# Patient Record
Sex: FEMALE | Race: WHITE | NOT HISPANIC OR LATINO | Employment: OTHER | URBAN - METROPOLITAN AREA
[De-identification: names, ages, dates, MRNs, and addresses within clinical notes are randomized per-mention and may not be internally consistent; named-entity substitution may affect disease eponyms.]

---

## 2017-04-10 ENCOUNTER — TRANSCRIBE ORDERS (OUTPATIENT)
Dept: ADMINISTRATIVE | Facility: HOSPITAL | Age: 67
End: 2017-04-10

## 2017-04-10 DIAGNOSIS — R31.9 HEMATURIA SYNDROME: Primary | ICD-10-CM

## 2017-04-14 ENCOUNTER — HOSPITAL ENCOUNTER (OUTPATIENT)
Dept: CT IMAGING | Facility: CLINIC | Age: 67
Discharge: HOME | End: 2017-04-14
Payer: MEDICARE

## 2017-04-14 DIAGNOSIS — R31.9 HEMATURIA SYNDROME: ICD-10-CM

## 2017-04-14 PROCEDURE — 74176 CT ABD & PELVIS W/O CONTRAST: CPT

## 2018-05-15 ENCOUNTER — TRANSCRIBE ORDERS (OUTPATIENT)
Dept: ADMINISTRATIVE | Facility: HOSPITAL | Age: 68
End: 2018-05-15

## 2018-05-15 DIAGNOSIS — R10.13 ABDOMINAL PAIN, EPIGASTRIC: Primary | ICD-10-CM

## 2018-05-17 ENCOUNTER — HOSPITAL ENCOUNTER (OUTPATIENT)
Dept: ULTRASOUND IMAGING | Facility: CLINIC | Age: 68
Discharge: HOME | End: 2018-05-17
Payer: MEDICARE

## 2018-05-17 DIAGNOSIS — R10.13 ABDOMINAL PAIN, EPIGASTRIC: ICD-10-CM

## 2018-05-17 PROCEDURE — 76705 ECHO EXAM OF ABDOMEN: CPT

## 2018-12-20 ENCOUNTER — APPOINTMENT (OUTPATIENT)
Dept: RADIOLOGY | Facility: CLINIC | Age: 68
End: 2018-12-20
Payer: MEDICARE

## 2018-12-20 ENCOUNTER — TRANSCRIBE ORDERS (OUTPATIENT)
Dept: ADMINISTRATIVE | Facility: HOSPITAL | Age: 68
End: 2018-12-20

## 2018-12-20 DIAGNOSIS — R06.02 SHORTNESS OF BREATH: Primary | ICD-10-CM

## 2018-12-20 DIAGNOSIS — R06.02 SHORTNESS OF BREATH: ICD-10-CM

## 2018-12-20 PROCEDURE — 71046 X-RAY EXAM CHEST 2 VIEWS: CPT

## 2019-03-07 ENCOUNTER — TRANSCRIBE ORDERS (OUTPATIENT)
Dept: ADMINISTRATIVE | Facility: HOSPITAL | Age: 69
End: 2019-03-07

## 2019-03-07 ENCOUNTER — HOSPITAL ENCOUNTER (OUTPATIENT)
Dept: RADIOLOGY | Facility: HOSPITAL | Age: 69
Discharge: HOME | End: 2019-03-07
Payer: MEDICARE

## 2019-03-07 DIAGNOSIS — R06.02 SHORTNESS OF BREATH: ICD-10-CM

## 2019-03-07 DIAGNOSIS — R06.02 SHORTNESS OF BREATH: Primary | ICD-10-CM

## 2019-03-07 PROCEDURE — 71046 X-RAY EXAM CHEST 2 VIEWS: CPT

## 2021-03-04 DIAGNOSIS — Z23 ENCOUNTER FOR IMMUNIZATION: ICD-10-CM

## 2021-03-25 ENCOUNTER — IMMUNIZATIONS (OUTPATIENT)
Dept: FAMILY MEDICINE CLINIC | Facility: HOSPITAL | Age: 71
End: 2021-03-25

## 2021-03-25 DIAGNOSIS — Z23 ENCOUNTER FOR IMMUNIZATION: Primary | ICD-10-CM

## 2021-03-25 PROCEDURE — 91300 SARS-COV-2 / COVID-19 MRNA VACCINE (PFIZER-BIONTECH) 30 MCG: CPT

## 2021-03-25 PROCEDURE — 0001A SARS-COV-2 / COVID-19 MRNA VACCINE (PFIZER-BIONTECH) 30 MCG: CPT

## 2021-04-08 ENCOUNTER — TRANSCRIBE ORDERS (OUTPATIENT)
Dept: ADMINISTRATIVE | Facility: HOSPITAL | Age: 71
End: 2021-04-08

## 2021-04-08 DIAGNOSIS — R10.13 EPIGASTRIC PAIN: Primary | ICD-10-CM

## 2021-04-12 ENCOUNTER — HOSPITAL ENCOUNTER (OUTPATIENT)
Dept: ULTRASOUND IMAGING | Facility: CLINIC | Age: 71
Discharge: HOME | End: 2021-04-12
Payer: MEDICARE

## 2021-04-12 DIAGNOSIS — R10.13 EPIGASTRIC PAIN: ICD-10-CM

## 2021-04-12 PROCEDURE — 76705 ECHO EXAM OF ABDOMEN: CPT

## 2021-04-16 ENCOUNTER — IMMUNIZATIONS (OUTPATIENT)
Dept: FAMILY MEDICINE CLINIC | Facility: HOSPITAL | Age: 71
End: 2021-04-16

## 2021-04-16 DIAGNOSIS — Z23 ENCOUNTER FOR IMMUNIZATION: Primary | ICD-10-CM

## 2021-04-16 PROCEDURE — 0002A SARS-COV-2 / COVID-19 MRNA VACCINE (PFIZER-BIONTECH) 30 MCG: CPT

## 2021-04-16 PROCEDURE — 91300 SARS-COV-2 / COVID-19 MRNA VACCINE (PFIZER-BIONTECH) 30 MCG: CPT

## 2022-12-07 ENCOUNTER — APPOINTMENT (OUTPATIENT)
Dept: RADIOLOGY | Facility: CLINIC | Age: 72
End: 2022-12-07

## 2022-12-07 DIAGNOSIS — R06.09 DYSPNEA ON EXERTION: ICD-10-CM

## 2022-12-07 DIAGNOSIS — J45.909 ASTHMATIC BRONCHITIS WITHOUT COMPLICATION, UNSPECIFIED ASTHMA SEVERITY, UNSPECIFIED WHETHER PERSISTENT: ICD-10-CM

## 2023-11-28 ENCOUNTER — TELEPHONE (OUTPATIENT)
Dept: GASTROENTEROLOGY | Facility: CLINIC | Age: 73
End: 2023-11-28

## 2023-11-28 ENCOUNTER — OFFICE VISIT (OUTPATIENT)
Dept: GASTROENTEROLOGY | Facility: CLINIC | Age: 73
End: 2023-11-28
Payer: MEDICARE

## 2023-11-28 VITALS
DIASTOLIC BLOOD PRESSURE: 85 MMHG | SYSTOLIC BLOOD PRESSURE: 181 MMHG | BODY MASS INDEX: 44.17 KG/M2 | HEIGHT: 60 IN | WEIGHT: 225 LBS

## 2023-11-28 DIAGNOSIS — E66.01 OBESITY, MORBID (HCC): ICD-10-CM

## 2023-11-28 DIAGNOSIS — K62.5 RECTAL BLEEDING: ICD-10-CM

## 2023-11-28 DIAGNOSIS — K21.9 GASTROESOPHAGEAL REFLUX DISEASE WITHOUT ESOPHAGITIS: ICD-10-CM

## 2023-11-28 DIAGNOSIS — Z79.01 CHRONIC ANTICOAGULATION: Primary | ICD-10-CM

## 2023-11-28 PROCEDURE — 99204 OFFICE O/P NEW MOD 45 MIN: CPT | Performed by: INTERNAL MEDICINE

## 2023-11-28 RX ORDER — FLUTICASONE PROPIONATE AND SALMETEROL 250; 50 UG/1; UG/1
POWDER RESPIRATORY (INHALATION)
COMMUNITY

## 2023-11-28 RX ORDER — HYDROCHLOROTHIAZIDE 12.5 MG/1
12.5 TABLET ORAL DAILY
COMMUNITY
Start: 2023-11-20

## 2023-11-28 RX ORDER — METOPROLOL SUCCINATE 50 MG/1
TABLET, EXTENDED RELEASE ORAL
COMMUNITY

## 2023-11-28 RX ORDER — RIVAROXABAN 20 MG/1
TABLET, FILM COATED ORAL
COMMUNITY

## 2023-11-28 RX ORDER — AMLODIPINE BESYLATE 5 MG/1
TABLET ORAL
COMMUNITY

## 2023-11-28 RX ORDER — OMEPRAZOLE 20 MG/1
CAPSULE, DELAYED RELEASE ORAL
COMMUNITY
Start: 2023-09-24

## 2023-11-28 RX ORDER — METOPROLOL SUCCINATE 25 MG/1
TABLET, EXTENDED RELEASE ORAL
COMMUNITY
Start: 2023-11-20

## 2023-11-28 RX ORDER — ATORVASTATIN CALCIUM 10 MG/1
TABLET, FILM COATED ORAL
COMMUNITY

## 2023-11-28 NOTE — TELEPHONE ENCOUNTER
Scheduled date of colonoscopy (as of today): 1/30/24  Physician performing colonoscopy: Dr. Shayna Juares  Location of colonoscopy: [address] [place] [address]  Bowel prep reviewed with patient: Clenpiq  Instructions reviewed with patient by: Keira Villanueva  Clearances: Alfonso sanchez **seeing cardiologist beginning of January

## 2023-11-28 NOTE — PROGRESS NOTES
[address] [place] [address] [facility] - Outpatient Consultation  Babs Elise 72 y.o. female MRN: [id number]  Encounter: [id number]    ASSESSMENT AND PLAN:      1. Chronic anticoagulation  She is on Xarelto for A-fib. Apparently has been in sinus rhythm this may be discontinued in January    2. Gastroesophageal reflux disease without esophagitis  History of this with strictures stable and asymptomatic    3. Rectal bleeding  New. Accompanied by change in bowel habits specifically narrowed stool decreased caliber change in bowel habits. Most likely this is progression of the diverticular disease which was noted on the previous colonoscopy. Need to exclude colitis and or neoplasia. Is also possible this could be hemorrhoids simply from straining. I have scheduled her for colonoscopy she should be off the Xarelto for this. We discussed the merits of fiber for diverticular disease and also their tendency to cause gas in my opinion the benefits in her exceed the risks. Further comments after the colonoscopy  - Colonoscopy; Future  - sodium picosulfate, magnesium oxide, citric acid (Clenpiq) oral solution; Take 175 mL (1 bottle) the evening before the colonoscopy, between 5 PM and 9 PM, followed by a second 175 mL bottle 5 hours before the colonoscopy. Dispense: 350 mL; Refill: 0    4. Obesity, morbid ([address])  History of this      Followup Appointment: 2 months  ______________________________________________________________________    Chief Complaint   Patient presents with    bowel issues       HPI:   Jairo Starr is a very pleasant 72y.o. year old female known to us from the past with a history of acid reflux and ulcer disease. She reports that over the last few months she has had increasing difficulty with her bowels. She says sometimes her stool is narrow sometimes it is difficult to pass other times it is soft. She has to strain quite a bit to get it out.   She also reports an increase amount of gas and occasional blood on the toilet paper with wiping. She denies any trouble with reflux or dysphagia although she has had this issue in the past.  Last colonoscopy was 2017 and did show diverticulosis. Historical Information   History reviewed. No pertinent past medical history. Atrial fibrillation on anticoagulation  History reviewed. No pertinent surgical history. Appendectomy previous colonoscopy as outlined above  Social History     Substance and Sexual Activity   Alcohol Use None     Social History     Substance and Sexual Activity   Drug Use Not on file     Social History     Tobacco Use   Smoking Status Not on file   Smokeless Tobacco Not on file     History reviewed. No pertinent family history. Meds/Allergies     Current Outpatient Medications:     amLODIPine (NORVASC) 5 mg tablet    atorvastatin (LIPITOR) 10 mg tablet    Fluticasone-Salmeterol (Advair Diskus) 250-50 mcg/dose inhaler    hydrochlorothiazide (HYDRODIURIL) 12.5 mg tablet    metoprolol succinate (TOPROL-XL) 25 mg 24 hr tablet    metoprolol succinate (TOPROL-XL) 50 mg 24 hr tablet    omeprazole (PriLOSEC) 20 mg delayed release capsule    sodium picosulfate, magnesium oxide, citric acid (Clenpiq) oral solution    Xarelto 20 MG tablet    Allergies   Allergen Reactions    Iv Dye [Iodinated Contrast Media] Hives    Levaquin [Levofloxacin] Other (See Comments)     itching    Zocor [Simvastatin] Swelling     Swelling in hands when she took generic zocor        PHYSICAL EXAM:    Blood pressure (!) 181/85, height 5' (1.524 m), weight 102 kg (225 lb). Body mass index is 43.94 kg/m². General Appearance: NAD, cooperative, alert  Eyes: Anicteric, PERRLA, EOMI  ENT:  Normocephalic, atraumatic, normal mucosa. Neck:  Supple, symmetrical, trachea midline,   Resp:  Clear to auscultation bilaterally; no rales, rhonchi or wheezing; respirations unlabored   CV:  S1 S2, Regular rate and rhythm; no murmur, rub, or gallop.   GI: Soft, non-tender, non-distended; normal bowel sounds; no masses, no organomegaly   Rectal: Deferred  Musculoskeletal: No cyanosis, clubbing or edema. Normal ROM. Skin:  No jaundice, rashes, or lesions   Heme/Lymph: No palpable cervical lymphadenopathy  Psych: Normal affect, good eye contact  Neuro: No gross deficits, AAOx3    Lab Results:   No results found for: "WBC", "HGB", "HCT", "MCV", "PLT"  No results found for: "NA", "K", "CL", "CO2", "ANIONGAP", "BUN", "CREATININE", "GLUCOSE", "GLUF", "CALCIUM", "CORRECTEDCA", "AST", "ALT", "ALKPHOS", "PROT", "BILITOT", "EGFR"  No results found for: "IRON", "TIBC", "FERRITIN"  No results found for: "LIPASE"    Radiology Results:   No results found. REVIEW OF SYSTEMS:    CONSTITUTIONAL: Denies any fever, chills, rigors, and weight loss. HEENT: No earache or tinnitus. Denies hearing loss or visual disturbances. CARDIOVASCULAR: No chest pain or palpitations. RESPIRATORY: Denies any cough, hemoptysis, shortness of breath or dyspnea on exertion. GASTROINTESTINAL: As noted in the History of Present Illness. GENITOURINARY: No problems with urination. Denies any hematuria or dysuria. NEUROLOGIC: No dizziness or vertigo, denies headaches. MUSCULOSKELETAL: Denies any muscle or joint pain. SKIN: Denies skin rashes or itching. ENDOCRINE: Denies excessive thirst. Denies intolerance to heat or cold. PSYCHOSOCIAL: Denies depression or anxiety. Denies any recent memory loss.

## 2023-12-01 NOTE — TELEPHONE ENCOUNTER
Provider: Dr. Bishnu Kate  Procedure: Colonoscopy  Scheduled Date: 1/30/24  Location: [facility]  Medication(s) to stop: Xarelto  Days to hold: 2  Last dose should be: 1/27/24  Requested from: Dr. Sherman Jorgensen  Date requested: 12/1/24

## 2024-01-16 ENCOUNTER — ANESTHESIA EVENT (OUTPATIENT)
Dept: ANESTHESIOLOGY | Facility: AMBULATORY SURGERY CENTER | Age: 74
End: 2024-01-16

## 2024-01-16 ENCOUNTER — ANESTHESIA (OUTPATIENT)
Dept: ANESTHESIOLOGY | Facility: AMBULATORY SURGERY CENTER | Age: 74
End: 2024-01-16

## 2024-01-23 ENCOUNTER — TELEPHONE (OUTPATIENT)
Dept: GASTROENTEROLOGY | Facility: CLINIC | Age: 74
End: 2024-01-23

## 2024-01-23 NOTE — TELEPHONE ENCOUNTER
Procedure confirmed  Colonoscopy     Via: Voice mail    Instructions given: Given to Patient at Visit     Prep Given: Clenpiq    Call the office if there are any questions.      Also LVM for pt ok to hold Xarelto prior to procedure. Last dose is 1/27/24. Asked her to call back to confirm she received the message.

## 2024-01-30 ENCOUNTER — ANESTHESIA (OUTPATIENT)
Dept: GASTROENTEROLOGY | Facility: AMBULATORY SURGERY CENTER | Age: 74
End: 2024-01-30

## 2024-01-30 ENCOUNTER — HOSPITAL ENCOUNTER (OUTPATIENT)
Dept: GASTROENTEROLOGY | Facility: AMBULATORY SURGERY CENTER | Age: 74
Discharge: HOME | End: 2024-01-30
Payer: MEDICARE

## 2024-01-30 ENCOUNTER — ANESTHESIA EVENT (OUTPATIENT)
Dept: GASTROENTEROLOGY | Facility: AMBULATORY SURGERY CENTER | Age: 74
End: 2024-01-30

## 2024-01-30 VITALS
OXYGEN SATURATION: 98 % | RESPIRATION RATE: 20 BRPM | HEART RATE: 72 BPM | HEIGHT: 60 IN | SYSTOLIC BLOOD PRESSURE: 127 MMHG | TEMPERATURE: 97.7 F | BODY MASS INDEX: 44.17 KG/M2 | DIASTOLIC BLOOD PRESSURE: 67 MMHG | WEIGHT: 225 LBS

## 2024-01-30 DIAGNOSIS — K62.5 RECTAL BLEEDING: ICD-10-CM

## 2024-01-30 PROCEDURE — 45380 COLONOSCOPY AND BIOPSY: CPT | Performed by: INTERNAL MEDICINE

## 2024-01-30 PROCEDURE — 88305 TISSUE EXAM BY PATHOLOGIST: CPT | Performed by: STUDENT IN AN ORGANIZED HEALTH CARE EDUCATION/TRAINING PROGRAM

## 2024-01-30 RX ORDER — SODIUM CHLORIDE, SODIUM LACTATE, POTASSIUM CHLORIDE, CALCIUM CHLORIDE 600; 310; 30; 20 MG/100ML; MG/100ML; MG/100ML; MG/100ML
50 INJECTION, SOLUTION INTRAVENOUS CONTINUOUS
Status: DISCONTINUED | OUTPATIENT
Start: 2024-01-30 | End: 2024-02-03 | Stop reason: HOSPADM

## 2024-01-30 RX ORDER — LIDOCAINE HYDROCHLORIDE 20 MG/ML
INJECTION, SOLUTION EPIDURAL; INFILTRATION; INTRACAUDAL; PERINEURAL AS NEEDED
Status: DISCONTINUED | OUTPATIENT
Start: 2024-01-30 | End: 2024-01-30

## 2024-01-30 RX ORDER — PROPOFOL 10 MG/ML
INJECTION, EMULSION INTRAVENOUS AS NEEDED
Status: DISCONTINUED | OUTPATIENT
Start: 2024-01-30 | End: 2024-01-30

## 2024-01-30 RX ADMIN — PROPOFOL 40 MG: 10 INJECTION, EMULSION INTRAVENOUS at 08:17

## 2024-01-30 RX ADMIN — LIDOCAINE HYDROCHLORIDE 50 MG: 20 INJECTION, SOLUTION EPIDURAL; INFILTRATION; INTRACAUDAL; PERINEURAL at 08:15

## 2024-01-30 RX ADMIN — PROPOFOL 40 MG: 10 INJECTION, EMULSION INTRAVENOUS at 08:31

## 2024-01-30 RX ADMIN — PROPOFOL 30 MG: 10 INJECTION, EMULSION INTRAVENOUS at 08:37

## 2024-01-30 RX ADMIN — PROPOFOL 60 MG: 10 INJECTION, EMULSION INTRAVENOUS at 08:23

## 2024-01-30 RX ADMIN — PROPOFOL 20 MG: 10 INJECTION, EMULSION INTRAVENOUS at 08:27

## 2024-01-30 RX ADMIN — SODIUM CHLORIDE, SODIUM LACTATE, POTASSIUM CHLORIDE, CALCIUM CHLORIDE 50 ML/HR: 600; 310; 30; 20 INJECTION, SOLUTION INTRAVENOUS at 07:46

## 2024-01-30 RX ADMIN — PROPOFOL 80 MG: 10 INJECTION, EMULSION INTRAVENOUS at 08:15

## 2024-01-30 NOTE — H&P
History and Physical - [facility]  Kat Elise 73 y.o. female MRN: [id number]    HPI: Kat Elise is a 73 y.o. year old female who presents for colonoscopy for rectal bleeding she stopped her Xarelto 3 days ago    REVIEW OF SYSTEMS: Per the HPI, and otherwise unremarkable.    Historical Information   Past Medical History:   Diagnosis Date    Asthma     Atrial fibrillation (HCC)     CPAP (continuous positive airway pressure) dependence     GERD (gastroesophageal reflux disease)     History of diverticulitis     Hyperlipemia     Hypertension     Impaired fasting glucose     Sleep apnea      Past Surgical History:   Procedure Laterality Date    APPENDECTOMY      CARDIOVERSION       SECTION      COLON SURGERY      for obstrution    HYSTERECTOMY      TONSILLECTOMY      TUBAL LIGATION       Social History   Social History     Substance and Sexual Activity   Alcohol Use Yes    Comment: occasional     Social History     Substance and Sexual Activity   Drug Use Never     Social History     Tobacco Use   Smoking Status Never   Smokeless Tobacco Never     Family History   Problem Relation Age of Onset    Colon cancer Paternal Grandmother        Meds/Allergies       Current Outpatient Medications:     amLODIPine (NORVASC) 5 mg tablet    atorvastatin (LIPITOR) 10 mg tablet    Fluticasone-Salmeterol (Advair Diskus) 250-50 mcg/dose inhaler    hydrochlorothiazide (HYDRODIURIL) 12.5 mg tablet    metoprolol succinate (TOPROL-XL) 25 mg 24 hr tablet    metoprolol succinate (TOPROL-XL) 50 mg 24 hr tablet    omeprazole (PriLOSEC) 20 mg delayed release capsule    sodium picosulfate, magnesium oxide, citric acid (Clenpiq) oral solution    Xarelto 20 MG tablet    Current Facility-Administered Medications:     lactated ringers infusion, 50 mL/hr, Intravenous, Continuous, Continue from Pre-op at 24 0751    Allergies   Allergen Reactions    Iv Dye [Iodinated Contrast Media] Hives     Levaquin [Levofloxacin] Other (See Comments)     itching    Zocor [Simvastatin] Swelling     Swelling in hands when she took generic zocor        Objective     /73   Pulse 74   Temp 97.7 °F (36.5 °C) (Temporal)   Resp 18   Ht 5' (1.524 m)   Wt 102 kg (225 lb)   SpO2 94%   BMI 43.94 kg/m²     PHYSICAL EXAM    Gen: NAD AAOx3  Head: Normocephalic, Atraumatic  CV: S1S2 RRR no m/r/g  CHEST: Clear b/l no c/r/w  ABD: soft, +BS NT/ND  EXT: no edema    ASSESSMENT/PLAN:  This is a 73 y.o. year old female here for colonoscopy, and she is stable and optimized for her procedure.

## 2024-01-30 NOTE — ANESTHESIA POSTPROCEDURE EVALUATION
Post-Op Assessment Note    CV Status:  Stable  Pain Score: 0    Pain management: adequate       Mental Status:  Sleepy   Hydration Status:  Stable   PONV Controlled:  None   Airway Patency:  Patent     Post Op Vitals Reviewed: Yes    No anethesia notable event occurred.    Staff: Anesthesiologist, CRNA               BP   116/67   Temp      Pulse  66   Resp   14   SpO2   100

## 2024-01-30 NOTE — ANESTHESIA PROCEDURE NOTES
Anesthesia Notable Event    Date/Time: 1/30/2024 9:20 AM    Performed by: Devonte Avina MD  Authorized by: Devonte Avina MD

## 2024-01-30 NOTE — ANESTHESIA PREPROCEDURE EVALUATION
Procedure:  COLONOSCOPY  Asthma Stable on Advair, No Recent URI      Atrial fibrillation (HCC)OFF Xarelto x 48 hours      CPAP (continuous positive airway pressure) dependence      GERD (gastroesophageal reflux disease)      History of diverticulitis      Hyperlipemia      Hypertension      Impaired fasting glucose      Sleep apnea        Relevant Problems   No relevant active problems   BMI-44   Physical Exam    Airway       Dental       Cardiovascular      Pulmonary      Other Findings  post-pubertal.  Physical Exam    Airway    Mallampati score: III  TM Distance: >3 FB  Neck ROM: full     Dental   No notable dental hx     Cardiovascular      Pulmonary      Other Findings  post-pubertal.      Anesthesia Plan  ASA Score- 3     Anesthesia Type- IV sedation with anesthesia with ASA Monitors.         Additional Monitors:     Airway Plan:            Plan Factors-Exercise tolerance (METS): >4 METS.    Chart reviewed.    Patient summary reviewed.    Patient is not a current smoker.              Induction- intravenous.    Postoperative Plan-     Informed Consent- Anesthetic plan and risks discussed with patient.  I personally reviewed this patient with the CRNA. Discussed and agreed on the Anesthesia Plan with the CRNA..

## 2024-02-01 PROCEDURE — 88305 TISSUE EXAM BY PATHOLOGIST: CPT | Performed by: STUDENT IN AN ORGANIZED HEALTH CARE EDUCATION/TRAINING PROGRAM

## 2024-02-06 RX ORDER — PREDNISONE 20 MG/1
TABLET ORAL
COMMUNITY
Start: 2023-11-06

## 2024-02-07 ENCOUNTER — OFFICE VISIT (OUTPATIENT)
Age: 74
End: 2024-02-07
Payer: MEDICARE

## 2024-02-07 VITALS
BODY MASS INDEX: 43.84 KG/M2 | HEART RATE: 74 BPM | DIASTOLIC BLOOD PRESSURE: 70 MMHG | SYSTOLIC BLOOD PRESSURE: 138 MMHG | OXYGEN SATURATION: 96 % | WEIGHT: 224.5 LBS

## 2024-02-07 DIAGNOSIS — F51.02 ADJUSTMENT INSOMNIA: ICD-10-CM

## 2024-02-07 DIAGNOSIS — E66.01 OBESITY, MORBID (HCC): ICD-10-CM

## 2024-02-07 DIAGNOSIS — G47.33 OSA (OBSTRUCTIVE SLEEP APNEA): Primary | ICD-10-CM

## 2024-02-07 DIAGNOSIS — G47.19 EXCESSIVE DAYTIME SLEEPINESS: ICD-10-CM

## 2024-02-07 DIAGNOSIS — R06.83 SNORING: ICD-10-CM

## 2024-02-07 PROCEDURE — 99204 OFFICE O/P NEW MOD 45 MIN: CPT | Performed by: INTERNAL MEDICINE

## 2024-02-07 RX ORDER — ZALEPLON 5 MG/1
5 CAPSULE ORAL
Qty: 1 CAPSULE | Refills: 0 | Status: SHIPPED | OUTPATIENT
Start: 2024-02-07

## 2024-02-07 NOTE — PATIENT INSTRUCTIONS
Sleep Apnea   AMBULATORY CARE:   Sleep apnea  is a condition that causes you to stop breathing often during sleep.  Types of sleep apnea:   Obstructive sleep apnea (PILAR)  is the most common kind. The muscles and tissues around your throat relax and block air from passing through. Obesity, use of alcohol or cigarettes, or a family history are common causes. PILAR may increase your risk for complications after surgery.         Central sleep apnea (CSA)  means your brain does not send signals to the muscles that control breathing. You do not take a breath even though your airway is open. Common causes include medical conditions such as heart failure, being older than 40, or use of opioids.    Complex (or mixed) sleep apnea  means you have both obstructive and central sleep apnea.    Common signs and symptoms:   Loud snoring or long pauses in breathing    Feeling sleepy, slow, and tired during the day    Snorting, gasping, or choking while you sleep, and waking up suddenly because of these    Feeling irritable during the day    Dry mouth or a headache in the mornings    Heavy night sweating    A hard time thinking, remembering things, or focusing on your tasks the following day    Call your local emergency number (911 in the ) if:   You have chest pain or trouble breathing.      Call your doctor if:   You have new or worsening signs or symptoms.     You have questions or concerns about your condition or care.    Treatment  depends on the kind of apnea you have.  A mouth device  may be needed if you have mild sleep apnea. These are designed to keep your throat open. Ask your dentist or healthcare provider about the best mouth device for you.    A machine  may be used to help you get more air during sleep. A mask may be placed over your nose and mouth, or just your nose. The mask is hooked to the machine. You will get air through the mask.    A continuous positive airway pressure (CPAP) machine  is used to keep your  airway open during sleep. The machine blows a gentle stream of air into the mask when you breathe. This helps keep your airway open so you can breathe more regularly. Extra oxygen may be given through the machine.         A bilevel positive airway pressure (BiPAP) machine  gives air but lowers the pressure when you breathe out.    An adaptive servo-ventilator (ASV)  is a machine that learns your usual breathing pattern. Then, it uses pressure to give you air and prevent stops in your breathing.    Surgery  to expand your airway or remove extra tissues may be needed. Surgery is usually only considered if other treatments do not work.    Manage or prevent sleep apnea:   Reach and maintain a healthy weight.  Ask your healthcare provider what a healthy weight is for you. Ask your provider to help you create a safe weight loss plan if you are overweight. Even a small goal of a 10% weight loss can improve your symptoms.    Do not smoke.  Nicotine and other chemicals in cigarettes and cigars can cause lung damage. Ask your healthcare provider for information if you currently smoke and need help to quit. E-cigarettes or smokeless tobacco still contain nicotine. Talk to your healthcare provider before you use these products.    Do not drink alcohol or take sedative medicine before you go to sleep.  Alcohol and sedatives can relax the muscles and tissues around your throat. This can block the airflow to your lungs.    Sleep on your side or use pillows designed to prevent sleep apnea.  This prevents your tongue or other tissues from blocking your throat. You can also raise the head of your bed.    Follow up with your doctor or specialist as directed:  You may need to have blood tests during your follow-up visits. Work with your provider to find the right breathing support equipment and settings for you. Write down your questions so you remember to ask them during your visits.  © Copyright [employer] 2023 Information is for End  User's use only and may not be sold, redistributed or otherwise used for commercial purposes.  The above information is an  only. It is not intended as medical advice for individual conditions or treatments. Talk to your doctor, nurse or pharmacist before following any medical regimen to see if it is safe and effective for you.

## 2024-02-07 NOTE — RESULT ENCOUNTER NOTE
Biopsies of prominent ileocecal valve benign.  Probably a lipoma left a voicemail for the patient 5-year colonoscopy recall

## 2024-02-07 NOTE — PROGRESS NOTES
Pulmonary Consultation   Kat Elise 73 y.o. female MRN: [id number]  2/7/2024      Assessment and Plan:    1. PILAR (obstructive sleep apnea)  Diagnosed with obstructive sleep apnea a while back, used to follow-up with Dr. Biggs and changing her care now to [facility]  She tells me that her  tells her that she still snores while she is asleep and she is still feeling fatigued and waking up tired.  Her original sleep study was ordered based on the diagnosis of atrial fibrillation and she tells me that she almost never felt a difference  I would like to reestablish her diagnosis  I see that she is using her machine 100% of the time averaging 8 hours 30 minutes with a minimal residual AHI and a very acceptable mask leak as detailed below  - Split Study; Future    2. Adjustment insomnia  1 tablet on the night of sleep study  - zaleplon (SONATA) 5 MG capsule; Take 1 capsule (5 mg total) by mouth daily at bedtime On the night of the sleep study ONLY  Dispense: 1 capsule; Refill: 0    3. Snoring  While on the CPAP that is why I would like to get another sleep study  - Split Study; Future    4. Excessive daytime sleepiness  Almost feels fatigued CPAP did not help her significantly according to her history  - Split Study; Future    5. Obesity, morbid (HCC)  BMI of 43.84, noted she would benefit from weight loss        Return in about 3 months (around 5/7/2024).    History of Present Illness   HPI:  Kat Elise is a 73 y.o. female who is here for an evaluation she used to follow-up with Dr. Biggs for pulmonary for history of bronchial asthma, shortness of breath and at A-fib history that triggered a sleep study found to have obstructive sleep apnea we do not have a record of her original sleep study and we will know how bad or severe of her sleep apnea.  She has been using CPAP for about 3 to 4 years that a Onel machine replaced by the recall process tells me that she is always felt  tired fatigue during the day with no significant change after using her CPAP.  She uses Advair discus every day with improvement of her asthma symptoms.  She also on long-acting beta-blockers and anticoagulation for her atrial fibrillation which seems to be well-controlled.        Historical Information   Past Medical History:   Diagnosis Date    Asthma     Atrial fibrillation (HCC)     CPAP (continuous positive airway pressure) dependence     GERD (gastroesophageal reflux disease)     History of diverticulitis     Hyperlipemia     Hypertension     Impaired fasting glucose     Sleep apnea      Past Surgical History:   Procedure Laterality Date    APPENDECTOMY      CARDIOVERSION       SECTION      COLON SURGERY      for obstrution    HYSTERECTOMY      TONSILLECTOMY      TUBAL LIGATION       Family History   Problem Relation Age of Onset    Colon cancer Paternal Grandmother          Meds/Allergies     Current Outpatient Medications:     amLODIPine (NORVASC) 5 mg tablet, 2.5 in am and 2.5 in pm, Disp: , Rfl:     atorvastatin (LIPITOR) 10 mg tablet, , Disp: , Rfl:     Fluticasone-Salmeterol (Advair Diskus) 250-50 mcg/dose inhaler, , Disp: , Rfl:     hydrochlorothiazide (HYDRODIURIL) 12.5 mg tablet, Take 12.5 mg by mouth daily, Disp: , Rfl:     metoprolol succinate (TOPROL-XL) 25 mg 24 hr tablet, TAKE 1 TABLET BY MOUTH TWICE A DAY IN ADDITION TO THE 50MG TABLET TWICE A DAY, Disp: , Rfl:     metoprolol succinate (TOPROL-XL) 50 mg 24 hr tablet, , Disp: , Rfl:     omeprazole (PriLOSEC) 20 mg delayed release capsule, TAKE 1 CAPSULE BY MOUTH EVERY DAY 30 MINUTES BEFORE MORNING MEAL, Disp: , Rfl:     predniSONE 20 mg tablet, PLEASE SEE ATTACHED FOR DETAILED DIRECTIONS, Disp: , Rfl:     Xarelto 20 MG tablet, , Disp: , Rfl:     zaleplon (SONATA) 5 MG capsule, Take 1 capsule (5 mg total) by mouth daily at bedtime On the night of the sleep study ONLY, Disp: 1 capsule, Rfl: 0    sodium picosulfate, magnesium oxide,  "citric acid (Clenpiq) oral solution, Take 175 mL (1 bottle) the evening before the colonoscopy, between 5 PM and 9 PM, followed by a second 175 mL bottle 5 hours before the colonoscopy., Disp: 350 mL, Rfl: 0  Allergies   Allergen Reactions    Iv Dye [Iodinated Contrast Media] Hives    Levofloxacin Other (See Comments) and Itching     itching    Zocor [Simvastatin] Swelling     Swelling in hands when she took generic zocor        Vitals: Blood pressure 138/70, pulse 74, weight 102 kg (224 lb 8 oz), SpO2 96%. Body mass index is 43.84 kg/m². Oxygen Therapy  SpO2: 96 %  Oxygen Therapy: None (Room air)      Physical Exam  Gen: not in acute distress, Body mass index is 43.84 kg/m².  HEENT: supple, no adenopathy  Chest: normal respiratory efforts, clear breath sounds bilaterally  CV: RRR, no murmurs appreciated  Extremities: no edema  Neuro: AAO X3, no focal motor deficit  Skin:  Grossly intact, no rash, no erythema      Labs:  No results found for: \"WBC\", \"HGB\", \"HCT\", \"MCV\", \"PLT\"  No results found for: \"GLUCOSE\", \"CALCIUM\", \"NA\", \"K\", \"CO2\", \"CL\", \"BUN\", \"CREATININE\"  No results found for: \"IGE\"  No results found for: \"ALT\", \"AST\", \"GGT\", \"ALKPHOS\", \"BILITOT\"        Imaging and other studies: I have personally reviewed pertinent studies        Pulmonary function testing:   None to review    EKG, Pathology, and Other Studies: I have personally reviewed pertinent reports.      Compliance:  11/23-1/24  100% using the machine, averaging 8 hours 30 minutes, she is set about auto CPAP 5-15, residual AHI of 0.9, time in large leak is 28 seconds          Galileo Day MD  [facility]       Portions of the record may have been created with voice recognition software. Occasional wrong word or \"sound a like\" substitutions may have occurred due to the inherent limitations of voice recognition software. Read the chart carefully and recognize, using context, where substitutions have occurred.  "

## 2024-02-20 ENCOUNTER — OFFICE VISIT (OUTPATIENT)
Dept: GASTROENTEROLOGY | Facility: CLINIC | Age: 74
End: 2024-02-20
Payer: MEDICARE

## 2024-02-20 VITALS
SYSTOLIC BLOOD PRESSURE: 142 MMHG | WEIGHT: 224 LBS | HEIGHT: 60 IN | BODY MASS INDEX: 43.98 KG/M2 | DIASTOLIC BLOOD PRESSURE: 80 MMHG

## 2024-02-20 DIAGNOSIS — E66.01 OBESITY, MORBID (HCC): ICD-10-CM

## 2024-02-20 DIAGNOSIS — Z79.01 CHRONIC ANTICOAGULATION: ICD-10-CM

## 2024-02-20 DIAGNOSIS — K21.9 GASTROESOPHAGEAL REFLUX DISEASE WITHOUT ESOPHAGITIS: ICD-10-CM

## 2024-02-20 DIAGNOSIS — R10.11 RIGHT UPPER QUADRANT ABDOMINAL PAIN: Primary | ICD-10-CM

## 2024-02-20 PROCEDURE — 99214 OFFICE O/P EST MOD 30 MIN: CPT | Performed by: INTERNAL MEDICINE

## 2024-02-20 NOTE — PROGRESS NOTES
[facility] - Outpatient Follow-up Note  Kat Elise 73 y.o. female MRN: [id number]  Encounter: [id number]    ASSESSMENT AND PLAN:      1. Right upper quadrant abdominal pain  Not definitely related to the GI cycle but some right upper quadrant tenderness differential includes biliary colic she had a full colonoscopy essentially taken at out of the differential.  Renal colic would be a possibility it could also be a radiculopathy from a disc in her back.  Check urinalysis and ultrasound she will continue to observe the pain for associations.  - Urinalysis with microscopic; Future  - US abdomen complete; Future  - Urinalysis with microscopic    2. Gastroesophageal reflux disease without esophagitis  Stable    3. Obesity, morbid (HCC)  Chronic    4. Chronic anticoagulation  Chronic    5.  Colorectal screening negative colonoscopy done 3 weeks ago      Followup Appointment: 2 months  ______________________________________________________________________    Chief Complaint   Patient presents with    Follow-up     Patient c/o of abdominal pain     HPI: Patient reports right upper quadrant pain migrating to the epigastric area perhaps the last week or so.  She had colonoscopy about 3 weeks ago and was fine afterwards for at least a week.  She is not sure the right upper quadrant pain is related to the GI cycle although she is straining sometimes to move her bowels.  No urinary symptoms the pain sometimes migrates to the right back or flank area.    Historical Information   Past Medical History:   Diagnosis Date    Asthma     Atrial fibrillation (HCC)     CPAP (continuous positive airway pressure) dependence     GERD (gastroesophageal reflux disease)     History of diverticulitis     Hyperlipemia     Hypertension     Impaired fasting glucose     Sleep apnea      Past Surgical History:   Procedure Laterality Date    APPENDECTOMY      CARDIOVERSION       SECTION       COLON SURGERY      for obstrution    HYSTERECTOMY      TONSILLECTOMY      TUBAL LIGATION       Social History     Substance and Sexual Activity   Alcohol Use Yes    Comment: occasionally     Social History     Substance and Sexual Activity   Drug Use Yes     Social History     Tobacco Use   Smoking Status Never   Smokeless Tobacco Never     Family History   Problem Relation Age of Onset    Colon cancer Paternal Grandmother          Current Outpatient Medications:     amLODIPine (NORVASC) 5 mg tablet    atorvastatin (LIPITOR) 10 mg tablet    Fluticasone-Salmeterol (Advair Diskus) 250-50 mcg/dose inhaler    hydrochlorothiazide (HYDRODIURIL) 12.5 mg tablet    metoprolol succinate (TOPROL-XL) 25 mg 24 hr tablet    metoprolol succinate (TOPROL-XL) 50 mg 24 hr tablet    omeprazole (PriLOSEC) 20 mg delayed release capsule    psyllium (METAMUCIL) packet    Xarelto 20 MG tablet    zaleplon (SONATA) 5 MG capsule    predniSONE 20 mg tablet    sodium picosulfate, magnesium oxide, citric acid (Clenpiq) oral solution  Allergies   Allergen Reactions    Iv Dye [Iodinated Contrast Media] Hives    Levofloxacin Other (See Comments) and Itching     itching    Zocor [Simvastatin] Swelling     Swelling in hands when she took generic zocor      Reviewed medications and allergies and updated as indicated    PHYSICAL EXAM:    Blood pressure 142/80, height 5' (1.524 m), weight 102 kg (224 lb). Body mass index is 43.75 kg/m².  General Appearance: NAD, cooperative, alert  Eyes: Anicteric, conjunctiva pink  ENT:  Normocephalic, atraumatic, normal mucosa.    Neck:  Supple, symmetrical, trachea midline  Resp:  Clear to auscultation bilaterally; no rales, rhonchi or wheezing; respirations unlabored   CV:  S1 S2, Regular rate and rhythm; no murmur, rub, or gallop.  GI:  Soft, mild to moderate right upper quadrant tenderness, non-distended; normal bowel sounds; no masses, no organomegaly   Rectal: Deferred  Musculoskeletal: No cyanosis, clubbing or  "edema. Normal ROM.  Skin:  No jaundice, rashes, or lesions   Heme/Lymph: No palpable cervical lymphadenopathy  Psych: Normal affect, good eye contact  Neuro: No gross deficits, AAOx3    Lab Results:   No results found for: \"WBC\", \"HGB\", \"HCT\", \"MCV\", \"PLT\"  No results found for: \"NA\", \"K\", \"CL\", \"CO2\", \"ANIONGAP\", \"BUN\", \"CREATININE\", \"GLUCOSE\", \"GLUF\", \"CALCIUM\", \"CORRECTEDCA\", \"AST\", \"ALT\", \"ALKPHOS\", \"PROT\", \"BILITOT\", \"EGFR\"    Radiology Results:   Colonoscopy    Result Date: 1/30/2024  Narrative: Table formatting from the original result was not included. [facility] [address] [place] [id number] [phone] DATE OF SERVICE: 1/30/24 PHYSICIAN(S): Attending: Keon Gallegos MD Fellow: No Staff Documented INDICATION: Rectal bleeding POST-OP DIAGNOSIS: See the impression below. HISTORY: Prior colonoscopy: 5 years ago. BOWEL PREPARATION: Clenpiq PREPROCEDURE: Informed consent was obtained for the procedure, including sedation. Risks including but not limited to bleeding, infection, perforation, adverse drug reaction and aspiration were explained in detail. Also explained about less than 100% sensitivity with the exam and other alternatives. The patient was placed in the left lateral decubitus position. Procedure: Colonoscopy DETAILS OF PROCEDURE: Patient was taken to the procedure room where a time out was performed to confirm correct patient and correct procedure. The patient underwent monitored anesthesia care, which was administered by an anesthesia professional. The patient's blood pressure, heart rate, level of consciousness, oxygen, respirations and ECG were monitored throughout the procedure. A digital rectal exam was performed. The scope was introduced through the anus and advanced to the cecum. Retroflexion was performed in the rectum. The quality of bowel preparation was evaluated using the [place] Bowel Preparation Scale with scores of: right colon = 2, " transverse colon = 2, left colon = 2. The total BBPS score was 6. Bowel prep was adequate. The patient experienced no blood loss. The procedure was not difficult. The patient tolerated the procedure well. There were no apparent adverse events. ANESTHESIA INFORMATION: ASA: III Anesthesia Type: IV Sedation with Anesthesia MEDICATIONS: lactated ringers infusion 300 mL*  *From user-documented volume (Totals for administrations occurring from 0809 to 0843 on 01/30/24) FINDINGS: Lipoma in the cecum; performed cold forceps biopsy with partial removal. Versus prominent ileocecal valve versus adenoma which I doubt biopsies taken Multiple diverticula of moderate severity in the sigmoid colon; no bleeding was identified Healthy signs of previous surgery in the rectosigmoid 20 cm from the anal verge Internal small (grade 1) hemorrhoids; no bleeding was identified Normal EVENTS: Procedure Events Event Event Time ENDO CECUM REACHED 1/30/2024  8:31 AM ENDO SCOPE OUT TIME 1/30/2024  8:39 AM SPECIMENS: ID Type Source Tests Collected by Time Destination 1 : bx prominent ileocecal valve Tissue Ileocecal valve TISSUE EXAM Keon Gallegos MD 1/30/2024  8:36 AM  EQUIPMENT: Colonoscope -[id number]     Impression: Lipoma in the cecum; performed cold forceps biopsy with partial removal Diverticulosis of moderate severity in the sigmoid colon Healthy signs of previous surgery in the rectosigmoid 20 cm from the anal verge Small (grade 1) hemorrhoids Normal. RECOMMENDATION:  Repeat colonoscopy in 5 years  Personal history of colon polyps  We noticed a large valve or fatty tumor in the cecum which is the right side of the colon which is probably benign and insignificant.  Took biopsies I will contact you with results in approximately 2 weeks.  Okay to resume Xarelto today.  Keon Gallegos MD

## 2024-02-21 LAB
APPEARANCE UR: CLEAR
BACTERIA UR QL AUTO: NORMAL /HPF
BILIRUB UR QL STRIP: NEGATIVE
COLOR UR: YELLOW
GLUCOSE UR QL STRIP: NEGATIVE
HGB UR QL STRIP: NEGATIVE
HYALINE CASTS #/AREA URNS LPF: NORMAL /LPF
KETONES UR QL STRIP: NEGATIVE
LEUKOCYTE ESTERASE UR QL STRIP: NEGATIVE
NITRITE UR QL STRIP: NEGATIVE
PH UR STRIP: 7 [PH] (ref 5–8)
PROT UR QL STRIP: NEGATIVE
RBC #/AREA URNS HPF: NORMAL /HPF
SP GR UR STRIP: 1 (ref 1–1.03)
SQUAMOUS #/AREA URNS HPF: NORMAL /HPF
WBC #/AREA URNS HPF: NORMAL /HPF

## 2024-03-04 ENCOUNTER — TELEPHONE (OUTPATIENT)
Age: 74
End: 2024-03-04

## 2024-03-04 NOTE — TELEPHONE ENCOUNTER
Patient calling stating she would like to let Dr. Day know that she cancelled her split study. She states she cannot sleep somewhere overnight. She also does not feel comfortable with taking the sleeping pill that he prescribed for her. She still wants to keep her 3 month follow up on 5/16 to come in and discuss further with Dr. Day and also hopes to have her previous records from Dr. Biggs's office with [place].

## 2024-03-05 ENCOUNTER — HOSPITAL ENCOUNTER (OUTPATIENT)
Dept: ULTRASOUND IMAGING | Facility: CLINIC | Age: 74
Discharge: HOME | End: 2024-03-05
Payer: MEDICARE

## 2024-03-05 DIAGNOSIS — R10.11 RIGHT UPPER QUADRANT ABDOMINAL PAIN: ICD-10-CM

## 2024-03-05 PROCEDURE — 76700 US EXAM ABDOM COMPLETE: CPT

## 2024-04-22 ENCOUNTER — APPOINTMENT (OUTPATIENT)
Dept: RADIOLOGY | Facility: CLINIC | Age: 74
End: 2024-04-22
Payer: MEDICARE

## 2024-04-22 DIAGNOSIS — M25.562 LEFT KNEE PAIN, UNSPECIFIED CHRONICITY: ICD-10-CM

## 2024-04-22 PROCEDURE — 73564 X-RAY EXAM KNEE 4 OR MORE: CPT

## 2024-05-16 ENCOUNTER — OFFICE VISIT (OUTPATIENT)
Age: 74
End: 2024-05-16
Payer: COMMERCIAL

## 2024-05-16 VITALS
WEIGHT: 226.2 LBS | BODY MASS INDEX: 44.41 KG/M2 | OXYGEN SATURATION: 97 % | SYSTOLIC BLOOD PRESSURE: 130 MMHG | HEART RATE: 70 BPM | HEIGHT: 60 IN | DIASTOLIC BLOOD PRESSURE: 80 MMHG

## 2024-05-16 DIAGNOSIS — J45.40 MODERATE PERSISTENT ASTHMA WITHOUT COMPLICATION: ICD-10-CM

## 2024-05-16 DIAGNOSIS — R06.83 SNORING: ICD-10-CM

## 2024-05-16 DIAGNOSIS — E66.01 OBESITY, MORBID (HCC): ICD-10-CM

## 2024-05-16 DIAGNOSIS — G47.33 OSA (OBSTRUCTIVE SLEEP APNEA): Primary | ICD-10-CM

## 2024-05-16 PROCEDURE — G2211 COMPLEX E/M VISIT ADD ON: HCPCS | Performed by: INTERNAL MEDICINE

## 2024-05-16 PROCEDURE — 99213 OFFICE O/P EST LOW 20 MIN: CPT | Performed by: INTERNAL MEDICINE

## 2024-05-16 NOTE — PROGRESS NOTES
Pulmonary/Sleep Follow Up Note   Kat Elise 74 y.o. female MRN: [id number]  5/16/2024      Assessment and Plan:    1. PILAR (obstructive sleep apnea)  Diagnosed with obstructive sleep apnea a while back, used to follow-up with Dr. Biggs and changed her care now to [facility]-last seen in February of this year.  Very occasionally she snores while on the CPAP but she tells me for the most part that is very rare.  Her machine is about 2 years old, I refilled her supplies.  She tells me that she is relatively still fatigued during the day, from a sleep apnea perspective she is well treated I recommended her to discuss with primary care physician on ordering blood work such as vitamin D, iron levels and vitamin B12 levels  - PAP DME Resupply/Reorder    2. Obesity, morbid (HCC)  BMI 44.18, noted to benefit from weight loss    3. Moderate persistent asthma without complication  Has been well-controlled on Advair with no issues    4. Snoring  Very rarely while using his CPAP        Return in about 1 year (around 5/16/2025).    History of Present Illness   HPI:  Kat Elise is a 74 y.o. female who is here for follow-up appointment last seen and February of this year to reestablish care she used to see Dr. Biggs for history of bronchial asthma, sleep disordered breathing and she has been using CPAP for about 3 to 4 years her CPAP machine was replaced through [employer]/recall process, she reports history of very rare occasions snoring while on the CPAP and she has persistent daytime fatigue.  She has been on long-acting beta-blockers and anticoagulation for A-fib, she uses Advair for bronchial asthma with overall good control of her symptoms.        Historical Information   Past Medical History:   Diagnosis Date    Asthma     Atrial fibrillation (HCC)     CPAP (continuous positive airway pressure) dependence     GERD (gastroesophageal reflux disease)     History of diverticulitis     Hyperlipemia      Hypertension     Impaired fasting glucose     Sleep apnea      Past Surgical History:   Procedure Laterality Date    APPENDECTOMY      CARDIOVERSION       SECTION      COLON SURGERY      for obstrution    HYSTERECTOMY      TONSILLECTOMY      TUBAL LIGATION       Family History   Problem Relation Age of Onset    Colon cancer Paternal Grandmother          Meds/Allergies     Current Outpatient Medications:     amLODIPine (NORVASC) 5 mg tablet, 2.5 in am and 2.5 in pm, Disp: , Rfl:     atorvastatin (LIPITOR) 10 mg tablet, , Disp: , Rfl:     Fluticasone-Salmeterol (Advair Diskus) 250-50 mcg/dose inhaler, , Disp: , Rfl:     hydrochlorothiazide (HYDRODIURIL) 12.5 mg tablet, Take 12.5 mg by mouth daily, Disp: , Rfl:     metoprolol succinate (TOPROL-XL) 25 mg 24 hr tablet, TAKE 1 TABLET BY MOUTH TWICE A DAY IN ADDITION TO THE 50MG TABLET TWICE A DAY, Disp: , Rfl:     metoprolol succinate (TOPROL-XL) 50 mg 24 hr tablet, , Disp: , Rfl:     omeprazole (PriLOSEC) 20 mg delayed release capsule, TAKE 1 CAPSULE BY MOUTH EVERY DAY 30 MINUTES BEFORE MORNING MEAL, Disp: , Rfl:     predniSONE 20 mg tablet, PLEASE SEE ATTACHED FOR DETAILED DIRECTIONS, Disp: , Rfl:     psyllium (METAMUCIL) packet, Take 1 packet by mouth daily, Disp: , Rfl:     sodium picosulfate, magnesium oxide, citric acid (Clenpiq) oral solution, Take 175 mL (1 bottle) the evening before the colonoscopy, between 5 PM and 9 PM, followed by a second 175 mL bottle 5 hours before the colonoscopy., Disp: 350 mL, Rfl: 0    Xarelto 20 MG tablet, , Disp: , Rfl:     zaleplon (SONATA) 5 MG capsule, Take 1 capsule (5 mg total) by mouth daily at bedtime On the night of the sleep study ONLY, Disp: 1 capsule, Rfl: 0  Allergies   Allergen Reactions    Iv Dye [Iodinated Contrast Media] Hives    Levofloxacin Other (See Comments) and Itching     itching    Zocor [Simvastatin] Swelling     Swelling in hands when she took generic zocor        Vitals: Blood pressure 130/80,  "pulse 70, height 5' (1.524 m), weight 103 kg (226 lb 3.2 oz), SpO2 97%. Body mass index is 44.18 kg/m². Oxygen Therapy  SpO2: 97 %      Physical Exam  General:  Awake alert and oriented x 3, conversant without conversational dyspnea, NAD, normal affect  HEENT:   Sclera noninjected, nonicteric OU, Nares patent,  no craniofacial abnormalities  NECK:  Trachea midline, no accessory muscle use, no stridor,  JVP not elevated  PULM: No respiratory distress, no accessory muscle use  EXT: No cyanosis, no clubbing, no edema  NEURO: no focal neurologic deficits, AAOx3, moving all extremities appropriately    Labs: I have personally reviewed pertinent lab results., ABG: No results found for: \"PHART\", \"FWT0QCQ\", \"PO2ART\", \"DKB0WZS\", \"K3FMXXSW\", \"BEART\", \"SOURCE\", BNP: No results found for: \"BNP\", CBC: No results found for: \"WBC\", \"HGB\", \"HCT\", \"MCV\", \"PLT\", \"ADJUSTEDWBC\", \"RBC\", \"MCH\", \"MCHC\", \"RDW\", \"MPV\", \"NRBC\", CMP: No results found for: \"SODIUM\", \"K\", \"CL\", \"CO2\", \"ANIONGAP\", \"BUN\", \"CREATININE\", \"GLUCOSE\", \"CALCIUM\", \"AST\", \"ALT\", \"ALKPHOS\", \"PROT\", \"BILITOT\", \"EGFR\", PT/INR: No results found for: \"PT\", \"INR\", Troponin: No results found for: \"TROPONINI\"  No results found for: \"WBC\", \"HGB\", \"HCT\", \"MCV\", \"PLT\"  No results found for: \"GLUCOSE\", \"CALCIUM\", \"NA\", \"K\", \"CO2\", \"CL\", \"BUN\", \"CREATININE\"  No results found for: \"IGE\"  No results found for: \"ALT\", \"AST\", \"GGT\", \"ALKPHOS\", \"BILITOT\"                Galileo Day MD  [facility]       Portions of the record may have been created with voice recognition software. Occasional wrong word or \"sound a like\" substitutions may have occurred due to the inherent limitations of voice recognition software. Read the chart carefully and recognize, using context, where substitutions have occurred.  "

## 2024-05-16 NOTE — PATIENT INSTRUCTIONS
CPAP   AMBULATORY CARE:   CPAP (continuous positive airway pressure)  is a treatment that uses air pressure to keep your airways open while you sleep. CPAP is used to treat obstructive sleep apnea (PILAR). A CPAP machine connects the mask to the machine with a hose. Air pressure prevents your airway from collapsing or becoming blocked during sleep. Use your CPAP machine when you sleep, even when you nap. You may need to use a CPAP machine for the rest of your life.       Make CPAP easier to use:   At first, try to use your CPAP for a few hours every night.  Then slowly increase the length of time you use your machine. It takes time to adjust to CPAP treatment.    You may need to use a special moisturizer made for CPAP users.  You may need a mask that is a different size, shape, or material. Talk to your healthcare provider if your mask feels uncomfortable or irritates your skin.    Use a saline nasal spray at bedtime to help relieve nasal irritation.  A chin strap to help keep your mouth closed. A different type of mask can help dry mouth. Some machines come with a heated humidifier to help relieve these symptoms.    Talk to your healthcare provider if you are having problems adjusting to the air pressure.  He or she can tell you how to adjust the air pressure on your CPAP. You may need to start at a lower pressure and slowly increase it over time.    Call your doctor if:   You continue to feel very sleepy during the day, even after you wear your CPAP device as directed.    Your CPAP is causing a problem, such as a rash, that does not improve.    You have questions or concerns about your condition, care, or equipment.    Follow up with your doctor as directed:  Tell your healthcare provider if your mask no longer fits properly. Write down your questions so you remember to ask them during your visits.  © Copyright [employer] 2023 Information is for End User's use only and may not be sold, redistributed or otherwise used  for commercial purposes.  The above information is an  only. It is not intended as medical advice for individual conditions or treatments. Talk to your doctor, nurse or pharmacist before following any medical regimen to see if it is safe and effective for you.

## 2024-05-29 ENCOUNTER — APPOINTMENT (OUTPATIENT)
Dept: RADIOLOGY | Facility: CLINIC | Age: 74
End: 2024-05-29
Payer: MEDICARE

## 2024-05-29 ENCOUNTER — OFFICE VISIT (OUTPATIENT)
Dept: OBGYN CLINIC | Facility: CLINIC | Age: 74
End: 2024-05-29
Payer: MEDICARE

## 2024-05-29 VITALS
HEIGHT: 60 IN | BODY MASS INDEX: 43.39 KG/M2 | DIASTOLIC BLOOD PRESSURE: 72 MMHG | SYSTOLIC BLOOD PRESSURE: 132 MMHG | WEIGHT: 221 LBS

## 2024-05-29 DIAGNOSIS — M25.562 LEFT KNEE PAIN, UNSPECIFIED CHRONICITY: ICD-10-CM

## 2024-05-29 DIAGNOSIS — M70.52 PES ANSERINUS BURSITIS OF LEFT KNEE: Primary | ICD-10-CM

## 2024-05-29 PROCEDURE — 73560 X-RAY EXAM OF KNEE 1 OR 2: CPT

## 2024-05-29 PROCEDURE — 20610 DRAIN/INJ JOINT/BURSA W/O US: CPT | Performed by: ORTHOPAEDIC SURGERY

## 2024-05-29 PROCEDURE — 99203 OFFICE O/P NEW LOW 30 MIN: CPT | Performed by: ORTHOPAEDIC SURGERY

## 2024-05-29 RX ORDER — LIDOCAINE HYDROCHLORIDE 10 MG/ML
2 INJECTION, SOLUTION INFILTRATION; PERINEURAL
Status: COMPLETED | OUTPATIENT
Start: 2024-05-29 | End: 2024-05-29

## 2024-05-29 RX ORDER — BETAMETHASONE SODIUM PHOSPHATE AND BETAMETHASONE ACETATE 3; 3 MG/ML; MG/ML
3 INJECTION, SUSPENSION INTRA-ARTICULAR; INTRALESIONAL; INTRAMUSCULAR; SOFT TISSUE
Status: COMPLETED | OUTPATIENT
Start: 2024-05-29 | End: 2024-05-29

## 2024-05-29 RX ADMIN — BETAMETHASONE SODIUM PHOSPHATE AND BETAMETHASONE ACETATE 3 MG: 3; 3 INJECTION, SUSPENSION INTRA-ARTICULAR; INTRALESIONAL; INTRAMUSCULAR; SOFT TISSUE at 08:00

## 2024-05-29 RX ADMIN — LIDOCAINE HYDROCHLORIDE 2 ML: 10 INJECTION, SOLUTION INFILTRATION; PERINEURAL at 08:00

## 2024-05-29 NOTE — ASSESSMENT & PLAN NOTE
Findings consistent with left pes anserine bursitis/tendinitis.  Findings and treatment options were discussed with the patient.  X-rays were reviewed with her.  Recommend formal physical therapy for hamstring stretching and modalities.  Advised patient to use over-the-counter Aspercreme or Voltaren gel to the area as needed since she is unable to take NSAIDs due to being on a blood thinner.  She is to ice as needed as well.  Lastly, recommend a cortisone injection to the left pes anserine bursa today.  She accepted and tolerated the procedure well.  Advised to apply cold compress today.  Follow-up in 2 to 3 months for reevaluation.  All patient's questions were answered to her satisfaction.  This note is created using dictation transcription.  It may contain typographical errors, grammatical errors, improperly dictated words, background noise and other errors.

## 2024-05-29 NOTE — PROGRESS NOTES
Assessment:     1. Pes anserinus bursitis of left knee        Plan:     Problem List Items Addressed This Visit          Musculoskeletal and Integument    Pes anserinus bursitis of left knee - Primary     Findings consistent with left pes anserine bursitis/tendinitis.  Findings and treatment options were discussed with the patient.  X-rays were reviewed with her.  Recommend formal physical therapy for hamstring stretching and modalities.  Advised patient to use over-the-counter Aspercreme or Voltaren gel to the area as needed since she is unable to take NSAIDs due to being on a blood thinner.  She is to ice as needed as well.  Lastly, recommend a cortisone injection to the left pes anserine bursa today.  She accepted and tolerated the procedure well.  Advised to apply cold compress today.  Follow-up in 2 to 3 months for reevaluation.  All patient's questions were answered to her satisfaction.  This note is created using dictation transcription.  It may contain typographical errors, grammatical errors, improperly dictated words, background noise and other errors.         Relevant Medications    lidocaine (XYLOCAINE) 1 % injection 2 mL (Completed)    betamethasone acetate-betamethasone sodium phosphate (CELESTONE) injection 3 mg (Completed)    Other Relevant Orders    XR knee 1 or 2 vw left    Ambulatory Referral to Physical Therapy    Large joint arthrocentesis: L pes anserine bursa (Completed)      Subjective:     Patient ID: Kat Elise is a 74 y.o. female.  Chief Complaint:  This is a 74-year-old white female here for evaluation of her left knee.  Referred by PCP Dr. Sanabria.  She states the pain began gradually a few months ago.  She denies any injury or change in activities at that time.  The pain only occurs at nighttime.  She is able to do her normal daily activities without increased pain.  The pain is throbbing and aching in nature.  It wakes her up from sleep.  She denies any locking, catching or  giving away.  She has been taking Tylenol that provides some relief, but only lasts about 5 to 6 hours and then the pain wakes her up again.  She is unable to take NSAIDs due to being on a blood thinner.  No other treatment.  She denies any prior injury to that knee.    Allergy:  Allergies   Allergen Reactions    Iv Dye [Iodinated Contrast Media] Hives    Levofloxacin Other (See Comments) and Itching     itching    Zocor [Simvastatin] Swelling     Swelling in hands when she took generic zocor      Medications:  all current active meds have been reviewed  Past Medical History:  Past Medical History:   Diagnosis Date    Asthma     Atrial fibrillation (HCC)     CPAP (continuous positive airway pressure) dependence     GERD (gastroesophageal reflux disease)     History of diverticulitis     Hyperlipemia     Hypertension     Impaired fasting glucose     Sleep apnea      Past Surgical History:  Past Surgical History:   Procedure Laterality Date    APPENDECTOMY      CARDIOVERSION       SECTION      COLON SURGERY      for obstrution    HYSTERECTOMY      TONSILLECTOMY      TUBAL LIGATION       Family History:  Family History   Problem Relation Age of Onset    Colon cancer Paternal Grandmother      Social History:  Social History     Substance and Sexual Activity   Alcohol Use Yes    Comment: occasionally     Social History     Substance and Sexual Activity   Drug Use Yes     Social History     Tobacco Use   Smoking Status Never   Smokeless Tobacco Never     Review of Systems   Constitutional: Negative.    HENT: Negative.     Eyes: Negative.    Respiratory: Negative.     Cardiovascular: Negative.    Gastrointestinal: Negative.    Endocrine: Negative.    Genitourinary: Negative.    Musculoskeletal:  Positive for arthralgias (left knee). Negative for joint swelling.   Skin: Negative.    Allergic/Immunologic: Negative.    Neurological: Negative.    Hematological: Negative.    Psychiatric/Behavioral: Negative.            Objective:  BP Readings from Last 1 Encounters:   05/29/24 132/72      Wt Readings from Last 1 Encounters:   05/29/24 100 kg (221 lb)      BMI:   Estimated body mass index is 43.16 kg/m² as calculated from the following:    Height as of this encounter: 5' (1.524 m).    Weight as of this encounter: 100 kg (221 lb).  BSA:   Estimated body surface area is 1.95 meters squared as calculated from the following:    Height as of this encounter: 5' (1.524 m).    Weight as of this encounter: 100 kg (221 lb).   Physical Exam  Vitals and nursing note reviewed.   Constitutional:       General: She is not in acute distress.     Appearance: She is well-developed. She is obese.   HENT:      Head: Normocephalic and atraumatic.      Right Ear: External ear normal.      Left Ear: External ear normal.   Eyes:      Extraocular Movements: Extraocular movements intact.      Conjunctiva/sclera: Conjunctivae normal.   Pulmonary:      Effort: Pulmonary effort is normal. No respiratory distress.   Musculoskeletal:      Cervical back: Neck supple.      Left knee: No effusion.      Instability Tests: Medial Loree test negative and lateral Loree test negative.   Skin:     General: Skin is warm and dry.   Neurological:      Mental Status: She is alert and oriented to person, place, and time.      Deep Tendon Reflexes: Reflexes are normal and symmetric.   Psychiatric:         Mood and Affect: Mood normal.         Behavior: Behavior normal.       Left Knee Exam     Tenderness   The patient is experiencing tenderness in the pes anserinus (medial hamstring tendon).    Range of Motion   The patient has normal left knee ROM.    Tests   Loree:  Medial - negative Lateral - negative  Varus: negative Valgus: negative  Patellar apprehension: negative    Other   Erythema: absent  Scars: absent  Sensation: normal  Pulse: present  Swelling: none  Effusion: no effusion present            I have personally reviewed pertinent films in PACS and my  interpretation is x-rays of the left knee reveal mild osteoarthritis.  No soft tissue calcification.    Large joint arthrocentesis: L pes anserine bursa  Universal Protocol:  Consent: Verbal consent obtained.  Risks and benefits: risks, benefits and alternatives were discussed  Consent given by: patient  Patient understanding: patient states understanding of the procedure being performed  Site marked: the operative site was marked  Supporting Documentation  Indications: pain   Procedure Details  Location: knee - L pes anserine bursa  Preparation: Patient was prepped and draped in the usual sterile fashion  Needle size: 22 G  Ultrasound guidance: no  Approach: anteromedial  Medications administered: 2 mL lidocaine 1 %; 3 mg betamethasone acetate-betamethasone sodium phosphate 6 (3-3) mg/mL    Patient tolerance: patient tolerated the procedure well with no immediate complications  Dressing:  Sterile dressing applied        Scribe Attestation      I,:  Katie Beal PA-C am acting as a scribe while in the presence of the attending physician.:       I,:  Jorje Ordoñez MD personally performed the services described in this documentation    as scribed in my presence.:

## 2024-06-14 ENCOUNTER — EVALUATION (OUTPATIENT)
Dept: PHYSICAL THERAPY | Facility: CLINIC | Age: 74
End: 2024-06-14
Payer: MEDICARE

## 2024-06-14 DIAGNOSIS — M70.52 PES ANSERINUS BURSITIS OF LEFT KNEE: ICD-10-CM

## 2024-06-14 PROCEDURE — 97161 PT EVAL LOW COMPLEX 20 MIN: CPT | Performed by: PHYSICAL THERAPIST

## 2024-06-14 PROCEDURE — 97530 THERAPEUTIC ACTIVITIES: CPT | Performed by: PHYSICAL THERAPIST

## 2024-06-14 NOTE — PROGRESS NOTES
PT Evaluation     Today's date: 2024  Patient name: Kat Elise  : 1950  MRN: [id number]  Referring provider: Katie Beal PA-C  Dx:   Encounter Diagnosis     ICD-10-CM    1. Pes anserinus bursitis of left knee  M70.52 Ambulatory Referral to Physical Therapy                     Assessment  Impairments: abnormal or restricted ROM, activity intolerance, impaired physical strength and pain with function  Symptom irritability: moderate    Assessment details: Kat Elise is a 74 y.o. female presenting to outpatient physical therapy at [facility] with complaints of L knee pain, stiffness and weakness.  She presents with decreased range of motion, decreased strength, limited flexibility, poor postural awareness, poor body mechanics, altered gait pattern, poor balance, decreased tolerance to activity and decreased functional mobility due to Pes anserinus bursitis of left knee.  She would benefit from skilled PT services in order to address these deficits and reach maximum level of function.  Thank you for the referral!    Understanding of Dx/Px/POC: good     Prognosis: good    Goals  STG  1. Independent with HEP in 2 weeks  2. Decrease pain at worst by 50% in 2 weeks    LTG  1. Increase L knee strength in all planes to 5/5 in 4 weeks  2. Return to full, pain-free and unrestricted sleeping in 4 weeks        Plan  Patient would benefit from: skilled physical therapy  Planned modality interventions: thermotherapy: hydrocollator packs    Planned therapy interventions: manual therapy, neuromuscular re-education, therapeutic activities and therapeutic exercise    Frequency: 2x week  Duration in weeks: 4  Treatment plan discussed with: patient    Subjective Evaluation    History of Present Illness  Mechanism of injury: Pt reports 2 months of L knee pain with insidious onset, worse at night, so much that it wakes her up.   Patient Goals  Patient goal: sleep at night without waking up from  "pain  Pain  Current pain ratin  At best pain ratin  At worst pain ratin  Location: anteromedial  Quality: dull ache  Relieving factors: medications (Tylenol)  Exacerbated by: sleeping.  Progression: improved      Diagnostic Tests  X-ray: abnormal (Minimal medial compartment osteoarthritis)  Treatments  Previous treatment: injection treatment      Objective     Active Range of Motion   Left Knee   Flexion: WFL  Extension: 5 degrees with pain    Right Knee   Normal active range of motion    Passive Range of Motion   Left Knee   Flexion: WFL  Extension: 2 degrees with pain    Strength/Myotome Testing     Left Knee   Flexion: 4+  Extension: 4+  Quadriceps contraction: good    Tests     Left Knee   Negative anterior Lachman, lateral Loree, medial Loree, patellar apprehension, Thessaly's test at 5 degrees, Thessaly's test at 20 degrees, valgus stress test at 0 degrees, valgus stress test at 30 degrees, varus stress test at 0 degrees and varus stress test at 30 degrees.            EPOC: 2024      Manuals                                                                 Neuro Re-Ed             Quad set  10x10\"            LAQ iso             Lateral resisted walk             SLS                                                    Ther Ex             HR             TR             Slant board  10x10\"            SL step up             Leg press                                                    Ther Activity             Patient education: pathoanatomy, nature of sxs, POC, HEP   NS            Bike             Gait Training                                       Modalities                                            "

## 2024-06-17 ENCOUNTER — OFFICE VISIT (OUTPATIENT)
Dept: PHYSICAL THERAPY | Facility: CLINIC | Age: 74
End: 2024-06-17
Payer: MEDICARE

## 2024-06-17 DIAGNOSIS — M70.52 PES ANSERINUS BURSITIS OF LEFT KNEE: Primary | ICD-10-CM

## 2024-06-17 PROCEDURE — 97110 THERAPEUTIC EXERCISES: CPT

## 2024-06-17 PROCEDURE — 97112 NEUROMUSCULAR REEDUCATION: CPT

## 2024-06-17 NOTE — PROGRESS NOTES
"Daily Note     Today's date: 2024  Patient name: Kat Elise  : 1950  MRN: [id number]  Referring provider: Katie Beal PA-C  Dx:   Encounter Diagnosis     ICD-10-CM    1. Pes anserinus bursitis of left knee  M70.52                      Subjective: Pt reports she has med knee pain stating the worst pain is at night when she is trying to sleep.      Objective: See treatment diary below      Assessment: Tolerated treatment well w/ initial instruction of ex's per flow sheet.. Patient demonstrated fatigue post treatment and would benefit from continued PT for cont'd LE strengthening and pain management.  Pt was quick to c/o SOB on the bike.      Plan: Continue per plan of care.  Progress treatment as tolerated.       EPOC: 2024      Manuals            STM med knee  8'                                                  Neuro Re-Ed             Quad set  10x10\" 10x10\"           LAQ iso  15x5\"           Lateral resisted walk  OTB 3 laps           SLS  6\" 10x                                                  Ther Ex             HR  10           TR  10           Slant board  10x10\" 10x10\"           SL step up             Leg press             SLR flex  10           Hip abd  10x10\"                        Ther Activity             Patient education: pathoanatomy, nature of sxs, POC, HEP   NS            Bike  L1 5'           Gait Training                                       Modalities                                            "

## 2024-06-19 ENCOUNTER — OFFICE VISIT (OUTPATIENT)
Dept: PHYSICAL THERAPY | Facility: CLINIC | Age: 74
End: 2024-06-19
Payer: MEDICARE

## 2024-06-19 DIAGNOSIS — M70.52 PES ANSERINUS BURSITIS OF LEFT KNEE: Primary | ICD-10-CM

## 2024-06-19 PROCEDURE — 97140 MANUAL THERAPY 1/> REGIONS: CPT

## 2024-06-19 PROCEDURE — 97112 NEUROMUSCULAR REEDUCATION: CPT

## 2024-06-19 PROCEDURE — 97110 THERAPEUTIC EXERCISES: CPT

## 2024-06-19 NOTE — PROGRESS NOTES
"Daily Note     Today's date: 2024  Patient name: Kat Elise  : 1950  MRN: [id number]  Referring provider: Katie Beal PA-C  Dx:   Encounter Diagnosis     ICD-10-CM    1. Pes anserinus bursitis of left knee  M70.52                      Subjective: Pt reports she was sore post LV in med knee into med calf.      Objective: See treatment diary below      Assessment: Tolerated treatment well. Patient demonstrated fatigue post treatment and would benefit from continued PT for cont'd LE strengthening and pain management.  Pt given trial of cold LASAR today.   Pt c/o med jt pain w/ QS.  Pt needs cont'd work on strength.      Plan: Continue per plan of care.  Progress treatment as tolerated.       EPOC: 2024      Manuals           STM med knee  8' 6'          LASAR bursitis   6'                                    Neuro Re-Ed             Quad set  10x10\" 10x10\" 10x10\"          LAQ iso  15x5\" 15x5\"          Lateral resisted walk  OTB 3 laps OTB 3 laps          SLS  6\" 10x 10\"x5                                                 Ther Ex             HR  10 15          TR  10 15          Slant board  10x10\" 10x10\" No bd 3x20\"          SL step up             Leg press             SLR flex  10 10x2          Hip abd  10x10\" SL 10x2          HS strap stretch   1-\"x10                       Ther Activity             Patient education: pathoanatomy, nature of sxs, POC, HEP   NS            Bike  L1 5' L1 5'          Gait Training                                       Modalities                                              "

## 2024-06-24 ENCOUNTER — APPOINTMENT (OUTPATIENT)
Dept: PHYSICAL THERAPY | Facility: CLINIC | Age: 74
End: 2024-06-24
Payer: MEDICARE

## 2024-06-28 ENCOUNTER — OFFICE VISIT (OUTPATIENT)
Dept: PHYSICAL THERAPY | Facility: CLINIC | Age: 74
End: 2024-06-28
Payer: MEDICARE

## 2024-06-28 DIAGNOSIS — M70.52 PES ANSERINUS BURSITIS OF LEFT KNEE: Primary | ICD-10-CM

## 2024-06-28 PROCEDURE — 97110 THERAPEUTIC EXERCISES: CPT | Performed by: PHYSICAL THERAPIST

## 2024-06-28 PROCEDURE — 97140 MANUAL THERAPY 1/> REGIONS: CPT | Performed by: PHYSICAL THERAPIST

## 2024-06-28 PROCEDURE — 97112 NEUROMUSCULAR REEDUCATION: CPT | Performed by: PHYSICAL THERAPIST

## 2024-06-28 NOTE — PROGRESS NOTES
"Daily Note     Today's date: 2024  Patient name: Kat Elise  : 1950  MRN: [id number]  Referring provider: Katie Beal PA-C  Dx:   Encounter Diagnosis     ICD-10-CM    1. Pes anserinus bursitis of left knee  M70.52             Subjective: Compliant with HEP, no questions regarding POC, motivated to continue PT, soreness in medial knee and calf much improved \"feels good\"      Objective: See treatment diary below      Assessment: Tolerated treatment well with progression of treatment program as noted below requiring verbal and tactile cues from PT for safe execution of therapeutic exercise. Patient demonstrated fatigue post treatment, exhibited good technique with therapeutic exercises, and would benefit from continued PT      Plan: Progress treatment as tolerated.       EPOC: 2024      Manuals          STM med knee  8' 6' NS         LASAR bursitis   6' nv         L quad stretch (reggie)    NS                      Neuro Re-Ed             Quad set  10x10\" 10x10\" 10x10\" hep         LAQ iso  15x5\" 15x5\" 30x5\" 1lb         Lateral resisted walk  OTB 3 laps OTB 3 laps OTB 5 laps          SLS  6\" 10x 10\"x5 5x10\"                                                Ther Ex             HR  10 15 20         TR  10 15 20         Slant board  10x10\" 10x10\" No bd 3x20\" 10x10\"          SL step up             Leg press             SLR flex  10 10x2 10x2         Hip abd  10x10\" SL 10x2 nv         HS strap stretch   10\"x10 10x10\"         Active hs stretch    20x 5\"         Ther Activity             Patient education: pathoanatomy, nature of sxs, POC, HEP   NS   NS         Bike  L1 5' L1 5' L1 6'         Gait Training                                       Modalities                                                "

## 2024-07-02 ENCOUNTER — OFFICE VISIT (OUTPATIENT)
Dept: PHYSICAL THERAPY | Facility: CLINIC | Age: 74
End: 2024-07-02
Payer: MEDICARE

## 2024-07-02 DIAGNOSIS — M70.52 PES ANSERINUS BURSITIS OF LEFT KNEE: Primary | ICD-10-CM

## 2024-07-02 PROCEDURE — 97110 THERAPEUTIC EXERCISES: CPT | Performed by: PHYSICAL THERAPIST

## 2024-07-02 PROCEDURE — 97112 NEUROMUSCULAR REEDUCATION: CPT | Performed by: PHYSICAL THERAPIST

## 2024-07-02 PROCEDURE — 97530 THERAPEUTIC ACTIVITIES: CPT | Performed by: PHYSICAL THERAPIST

## 2024-07-02 NOTE — PROGRESS NOTES
"Daily Note     Today's date: 2024  Patient name: Kat Elise  : 1950  MRN: [id number]  Referring provider: Katie Beal PA-C  Dx:   Encounter Diagnosis     ICD-10-CM    1. Pes anserinus bursitis of left knee  M70.52             Subjective: Compliant with HEP, no questions regarding POC, motivated to continue PT      Objective: See treatment diary below      Assessment: Tolerated treatment well with progression of treatment program as noted below requiring verbal and tactile cues from PT for safe execution of therapeutic exercise. Patient demonstrated fatigue post treatment, exhibited good technique with therapeutic exercises, and would benefit from continued PT      Plan: Progress treatment as tolerated.       EPOC: 2024      Manuals          STM med knee  8' 6' NS         LASAR bursitis   6' nv         L quad stretch (reggie)    NS                      Neuro Re-Ed             Quad set  10x10\" 10x10\" 10x10\" hep         LAQ iso  15x5\" 15x5\" 30x5\" 1lb         Lateral resisted walk  OTB 3 laps OTB 3 laps OTB 5 laps          SLS  6\" 10x 10\"x5 5x10\"                                                Ther Ex             HR  10 15 20         TR  10 15 20         Slant board  10x10\" 10x10\" No bd 3x20\" 10x10\"          SL step up             Leg press             SLR flex  10 10x2 10x2         Hip abd  10x10\" SL 10x2 nv         HS strap stretch   10\"x10 10x10\"         Active hs stretch    20x 5\"         Ther Activity             Patient education: pathoanatomy, nature of sxs, POC, HEP   NS   NS         Bike  L1 5' L1 5' L2 6'         Gait Training                                       Modalities                                                "

## 2024-07-05 ENCOUNTER — OFFICE VISIT (OUTPATIENT)
Dept: PHYSICAL THERAPY | Facility: CLINIC | Age: 74
End: 2024-07-05
Payer: MEDICARE

## 2024-07-05 DIAGNOSIS — M70.52 PES ANSERINUS BURSITIS OF LEFT KNEE: Primary | ICD-10-CM

## 2024-07-05 PROCEDURE — 97110 THERAPEUTIC EXERCISES: CPT

## 2024-07-05 PROCEDURE — 97140 MANUAL THERAPY 1/> REGIONS: CPT

## 2024-07-05 PROCEDURE — 97112 NEUROMUSCULAR REEDUCATION: CPT

## 2024-07-05 NOTE — PROGRESS NOTES
Therapy Activity Session  Performed by Rehab Aide staff     TEP: AcuteTherapy Extention Program, activity plan was established by a licensed therapist and performed under the guidance of a licensed therapist.      [facility]                                                                                         PT Frequency Frequency Comments: MThF 3/3-5 by 1/23 AIDE                                                                                  OT Frequency Frequency Comments: +MTF 3/3-5 by 1/26 EROS/ALFRED (TEP M-F, re-assess 1/30)    SLP Frequency      Availability:  Attempted to work with patient this date, unable to due to  patient is still resting. Writer will reattempt later if time allows.     Tolerance/Participation  Attempted, Not seen.     SESSION    Activities/Exercises/Mobility completed this session:  Physical Therapy Exercises    TEP Follow Up Needed: Yes  Therapy Extender Program Discipline: OT                                                                                 Occupational Therapy Exercises    TEP Follow Up Needed: Yes  Therapy Extender Program Discipline: OT     OT Session Length (min): 10 minutes (01/24/24 1548)  OT Frequency: M-F (Alfred, [phone])    OT Task 1: Bicep flexion  OT Reps for Task 1: 10  OT Sets for Task 1: 1  OT Resistance for Task 1: AROM  OT Task 1 Completion: Completed (01/24/24 1548)    OT Task 2: Shoulder flexion  OT Reps for Task 2: 10  OT Sets for Task 2: 1  OT Resistance for Task 2: AROM  OT Task 2 Completion: Completed (01/24/24 1548)    OT Task 3: Knee extension  OT Reps for Task 3: 10  OT Sets for Task 3: 1  OT Resistance for Task 3: AROM  OT Task 3 Completion: Completed (completed d/f and hip ext., shocking sensation during d/f)       Speech Therapy Exercises    TEP Follow Up Needed: Yes                                                                          "Daily Note     Today's date: 2024  Patient name: Kat Elise  : 1950  MRN: [id number]  Referring provider: Katie Beal PA-C  Dx:   Encounter Diagnosis     ICD-10-CM    1. Pes anserinus bursitis of left knee  M70.52                      Subjective: Pt reports she was sore post LV that didn't last long but was a new location.      Objective: See treatment diary below      Assessment: Tolerated treatment well w/ progression of ex's. Patient demonstrated fatigue post treatment and would benefit from continued PT for cont'd LE strengthening and pain management.  Provided LASAR again this visit w/ reports of good relief post.      Plan: Continue per plan of care.  Progress treatment as tolerated.       EPOC: 2024      Manuals         STM med knee  8' 6' NS JK        LASAR bursitis   6' nv JK        L quad stretch (reggie)    NS                      Neuro Re-Ed             Quad set  10x10\" 10x10\" 10x10\" hep         LAQ iso  15x5\" 15x5\" 30x5\" 1lb 30x5\" 1.5lb        Lateral resisted walk  OTB 3 laps OTB 3 laps OTB 5 laps  OTB 5 laps         SLS  6\" 10x 10\"x5 5x10\" 5x10\"        bridges     10x5\"                                  Ther Ex             HR  10 15 20 Slt bd  x20        TR  10 15 20         Slant board  10x10\" 10x10\" No bd 3x20\" 10x10\"  10x10\"         SL step up             Leg press             SLR flex  10 10x2 10x2 10x2        Hip abd  10x10\" SL 10x2 nv         HS strap stretch   10\"x10 10x10\" 10x10\"        TB SLR x3     OTB 10x2        Active hs stretch    20x 5\"         Ther Activity             Patient education: pathoanatomy, nature of sxs, POC, HEP   NS   NS         Bike  L1 5' L1 5' L2 6' L2 6'        Gait Training                                       Modalities                                                  "

## 2024-07-08 ENCOUNTER — OFFICE VISIT (OUTPATIENT)
Dept: PHYSICAL THERAPY | Facility: CLINIC | Age: 74
End: 2024-07-08
Payer: MEDICARE

## 2024-07-08 DIAGNOSIS — M70.52 PES ANSERINUS BURSITIS OF LEFT KNEE: Primary | ICD-10-CM

## 2024-07-08 PROCEDURE — 97110 THERAPEUTIC EXERCISES: CPT

## 2024-07-08 PROCEDURE — 97140 MANUAL THERAPY 1/> REGIONS: CPT

## 2024-07-08 PROCEDURE — 97112 NEUROMUSCULAR REEDUCATION: CPT

## 2024-07-08 NOTE — PROGRESS NOTES
"Daily Note     Today's date: 2024  Patient name: Kat Elise  : 1950  MRN: [id number]  Referring provider: Katie Beal PA-C  Dx:   Encounter Diagnosis     ICD-10-CM    1. Pes anserinus bursitis of left knee  M70.52                      Subjective: Pt reports she cont's to have pain at night that wakes her.      Objective: See treatment diary below      Assessment: Tolerated treatment well. Patient demonstrated fatigue post treatment and would benefit from continued PT for cont'd work on pain management.  Pt w/ TrP in med gastroc and sensitive near pes anserine bursae.  Suggested pt try ice or heat prior to bad.      Plan: Continue per plan of care.  Progress treatment as tolerated.       EPOC: 2024      Manuals JK       STM med knee  8' 6' NS JK JK       LASAR bursitis   6' nv JK defer       L quad stretch (reggie)    NS                      Neuro Re-Ed             Quad set  10x10\" 10x10\" 10x10\" hep         LAQ iso  15x5\" 15x5\" 30x5\" 1lb 30x5\" 1.5lb 30x5\" 1.5lb       Lateral resisted walk  OTB 3 laps OTB 3 laps OTB 5 laps  OTB 5 laps  NV       SLS  6\" 10x 10\"x5 5x10\" 5x10\" 5x10\"       bridges     10x5\" 10x5\"                                 Ther Ex             HR  10 15 20 Slt bd  x20 Slt bd  x20       TR  10 15 20  Slt bd  x20       Slant board  10x10\" 10x10\" No bd 3x20\" 10x10\"  10x10\"  1'       SL step up             Leg press             SLR flex  10 10x2 10x2 10x2 20x5\" 1.5lb       Hip abd  10x10\" SL 10x2 nv         HS strap stretch   10\"x10 10x10\" 10x10\" 10x10\"       TB SLR x3     OTB 10x2 OTB 10x2       Active hs stretch    20x 5\"         Ther Activity             Patient education: pathoanatomy, nature of sxs, POC, HEP   NS   NS         Bike  L1 5' L1 5' L2 6' L2 6' L2 6'       Gait Training                                       Modalities                                                    "

## 2024-07-10 ENCOUNTER — EVALUATION (OUTPATIENT)
Dept: PHYSICAL THERAPY | Facility: CLINIC | Age: 74
End: 2024-07-10
Payer: MEDICARE

## 2024-07-10 DIAGNOSIS — M70.52 PES ANSERINUS BURSITIS OF LEFT KNEE: Primary | ICD-10-CM

## 2024-07-10 PROCEDURE — 97112 NEUROMUSCULAR REEDUCATION: CPT | Performed by: PHYSICAL THERAPIST

## 2024-07-10 PROCEDURE — 97110 THERAPEUTIC EXERCISES: CPT | Performed by: PHYSICAL THERAPIST

## 2024-07-10 PROCEDURE — 97140 MANUAL THERAPY 1/> REGIONS: CPT | Performed by: PHYSICAL THERAPIST

## 2024-07-10 NOTE — PROGRESS NOTES
PT Re-Evaluation     Today's date: 7/10/2024  Patient name: Kat Elise  : 1950  MRN: [id number]  Referring provider: Katie Beal PA-C  Dx:   Encounter Diagnosis     ICD-10-CM    1. Pes anserinus bursitis of left knee  M70.52 Ambulatory Referral to Physical Therapy        Assessment  Impairments: abnormal or restricted ROM, activity intolerance, impaired physical strength and pain with function  Symptom irritability: moderate    Assessment details: Kat Elise is a 74 y.o. female seen in outpatient physical therapy at [facility] with complaints of L knee pain, stiffness and weakness.  She has been treated for decreased range of motion, decreased strength, limited flexibility, poor postural awareness, poor body mechanics, altered gait pattern, poor balance, decreased tolerance to activity and decreased functional mobility due to Pes anserinus bursitis of left knee.  Re-evaluation was performed to assess if she would benefit from skilled PT services in order to address these deficits and reach maximum level of function.  Thank you for the referral!    Understanding of Dx/Px/POC: good     Prognosis: good    Goals  STG  1. Independent with HEP in 2 weeks       Goal met  2. Decrease pain at worst by 50% in 2 weeks     Regressed    LTG  1. Increase L knee strength in all planes to 5/5 in 4 weeks    Good progress  2. Return to full, pain-free and unrestricted sleeping in 4 weeks   No change        Plan  Patient has made slow progress since IE and would benefit from: continued skilled physical therapy  Planned modality interventions: thermotherapy: hydrocollator packs  Planned therapy interventions: manual therapy, neuromuscular re-education, therapeutic activities and therapeutic exercise  Frequency: 2x week  Duration in weeks: 2  Treatment plan discussed with: patient    Subjective Evaluation    History of Present Illness  Mechanism of injury: Pt reports 2 months of L knee pain with insidious  "onset, worse at night, so much that it wakes her up.    Patient Goals  Patient goal: sleep at night without waking up from pain   No change  Pain  Current pain ratin  At best pain ratin  At worst pain ratin        10  Location: anteromedial  Quality: dull ache  Relieving factors: medications (Tylenol)     Heat makes it worst at night  Exacerbated by: sleeping.  Progression: improved      Diagnostic Tests  X-ray: abnormal (Minimal medial compartment osteoarthritis)  Treatments  Previous treatment: injection treatment      Objective     Active Range of Motion   Left Knee   Flexion: WFL      120 deg   Extension: 5 degrees with pain   0 deg painfree    Right Knee   Normal active range of motion    Passive Range of Motion   Left Knee   Flexion: WFL      120 deg  Extension: 2 degrees with pain   0 deg painfree    Strength/Myotome Testing     Left Knee   Flexion: 4+      5  Extension: 4+      5  Quadriceps contraction: good    Tests     SLS:  RLE = 10 sec  LLE = 5 sec    Left Knee   Negative anterior Lachman, lateral Loree, medial Loree, patellar apprehension, Thessaly's test at 5 degrees, Thessaly's test at 20 degrees, valgus stress test at 0 degrees, valgus stress test at 30 degrees, varus stress test at 0 degrees and varus stress test at 30 degrees.            EPOC: 2024      Manuals 7/10       STM med knee nv       LASAR bursitis defer       L quad stretch (reggie) NS       RE & FOTO NS       Neuro Re-Ed        Quad set         LAQ iso 30x5\" 1.5lb       Lateral resisted walk OTB 6ft x5 ea L/R       SLS 5x10\"       bridges 10x5\"                       Ther Ex        HR Slt bd  x20       TR Slt bd  x20       Slant board  1'       SL step up        Leg press        SLR flex 20x5\" 1.5lb       Hip abd        HS strap stretch 10x10\"       TB SLR x3 OTB 10x2       Active hs stretch        Ther Activity        Patient education: pathoanatomy, nature of sxs, POC, HEP          Bike L2 6'       Gait Training "                        Modalities

## 2024-07-15 ENCOUNTER — OFFICE VISIT (OUTPATIENT)
Dept: PHYSICAL THERAPY | Facility: CLINIC | Age: 74
End: 2024-07-15
Payer: MEDICARE

## 2024-07-15 DIAGNOSIS — M70.52 PES ANSERINUS BURSITIS OF LEFT KNEE: Primary | ICD-10-CM

## 2024-07-15 PROCEDURE — 97110 THERAPEUTIC EXERCISES: CPT

## 2024-07-15 PROCEDURE — 97112 NEUROMUSCULAR REEDUCATION: CPT

## 2024-07-15 NOTE — PROGRESS NOTES
"Daily Note     Today's date: 7/15/2024  Patient name: Kat Elise  : 1950  MRN: [id number]  Referring provider: Katie Beal PA-C  Dx:   Encounter Diagnosis     ICD-10-CM    1. Pes anserinus bursitis of left knee  M70.52                      Subjective: Pt reports she has min to no pain during the day w/ activity.  States she cont's w/ pain at night disturbing her sleep.  Pt states Tylenol helps her sleep, but she does not want to take the meds all the time.      Objective: See treatment diary below      Assessment: Tolerated treatment well w/ ex's. Patient demonstrated fatigue post treatment and would benefit from continued PT to work on pain management and cont'd LE strengthening.  Pt given trial of US to med knee/ pes anserine this visit.       Plan: Continue per plan of care.  Progress treatment as tolerated.       EPOC: 2024      Manuals 7/10 7/15      STM med knee nv       LASAR bursitis defer       L quad stretch (reggie) NS       RE & FOTO NS       Neuro Re-Ed        Quad set         LAQ iso 30x5\" 1.5lb 20x5\" 2.5lb      Lateral resisted walk OTB 6ft x5 ea L/R OTB 6ft x5 ea L/R      SLS 5x10\" 5x10\"      bridges 10x5\" 15x5\"                      Ther Ex        HR Slt bd  x20 Slt bd  x20      TR Slt bd  x20 Slt bd  x20      Slant board  1' 1'      SL step up        Leg press        SLR flex 20x5\" 1.5lb 20x5\" 1.5lb      Hip abd        HS strap stretch 10x10\" 10x10\"      TB SLR x3 OTB 10x2 OTB 10x2      Active hs stretch        Ther Activity        Patient education: pathoanatomy, nature of sxs, POC, HEP          Bike L2 6'       Gait Training                        Modalities        US 1/3 w/cm2  8' cont                               "

## 2024-07-18 ENCOUNTER — OFFICE VISIT (OUTPATIENT)
Dept: PHYSICAL THERAPY | Facility: CLINIC | Age: 74
End: 2024-07-18
Payer: MEDICARE

## 2024-07-18 DIAGNOSIS — M70.52 PES ANSERINUS BURSITIS OF LEFT KNEE: Primary | ICD-10-CM

## 2024-07-18 PROCEDURE — 97110 THERAPEUTIC EXERCISES: CPT

## 2024-07-18 PROCEDURE — 97112 NEUROMUSCULAR REEDUCATION: CPT

## 2024-07-18 NOTE — PROGRESS NOTES
"Daily Note     Today's date: 2024  Patient name: Kat Elise  : 1950  MRN: [id number]  Referring provider: Katie Beal PA-C  Dx:   Encounter Diagnosis     ICD-10-CM    1. Pes anserinus bursitis of left knee  M70.52             Start Time: 0745  Stop Time: 0830  Total time in clinic (min): 45 minutes    Subjective: Patient states that she has been implementing the ice to medial knee prior to bed, which has been effective as pain control. She states that she is ready to D/C this visit.       Objective: See treatment diary below. Reviewed HEP.       Assessment: Tolerated treatment well w/ no inc sxs. Good tolerance to modalities. HEP reviewed, patient verbalized understanding of activities. Patient demonstrates good form and independence with all activities and would benefit from D/C to HEP.      Plan: Pending PT approval D/C to HEP.  EPOC: 2024      Manuals 7/10 7/15 7/18     STM med knee nv       LASAR bursitis defer       L quad stretch (reggie) NS       RE & FOTO NS       Neuro Re-Ed        Quad set         LAQ iso 30x5\" 1.5lb 20x5\" 2.5lb 20x5\"      Lateral resisted walk OTB 6ft x5 ea L/R OTB 6ft x5 ea L/R OTB 6ft x5 ea L/R     SLS 5x10\" 5x10\" 5x10\"     bridges 10x5\" 15x5\" 15x5\"                     Ther Ex        HR Slt bd  x20 Slt bd  x20 Slt bd  x20     TR Slt bd  x20 Slt bd  x20 Slt bd  x20     Slant board  1' 1' 1'     SL step up        Leg press        SLR flex 20x5\" 1.5lb 20x5\" 1.5lb 20x5\" 1.5lb     Hip abd        HS strap stretch 10x10\" 10x10\" 10x10\"     TB SLR x3 OTB 10x2 OTB 10x2 OTB 10x2     Active hs stretch        Ther Activity        Patient education: pathoanatomy, nature of sxs, POC, HEP          Bike L2 6'  6'     Gait Training                        Modalities        US 1/3 w/cm2  8' cont 8'                              "

## 2024-07-24 ENCOUNTER — APPOINTMENT (OUTPATIENT)
Dept: PHYSICAL THERAPY | Facility: CLINIC | Age: 74
End: 2024-07-24
Payer: MEDICARE

## 2024-07-26 ENCOUNTER — APPOINTMENT (OUTPATIENT)
Dept: PHYSICAL THERAPY | Facility: CLINIC | Age: 74
End: 2024-07-26
Payer: MEDICARE

## 2024-08-08 ENCOUNTER — OFFICE VISIT (OUTPATIENT)
Dept: GASTROENTEROLOGY | Facility: CLINIC | Age: 74
End: 2024-08-08
Payer: MEDICARE

## 2024-08-08 VITALS
WEIGHT: 223 LBS | BODY MASS INDEX: 43.78 KG/M2 | SYSTOLIC BLOOD PRESSURE: 170 MMHG | HEIGHT: 60 IN | DIASTOLIC BLOOD PRESSURE: 90 MMHG

## 2024-08-08 DIAGNOSIS — K59.00 CONSTIPATION, UNSPECIFIED CONSTIPATION TYPE: ICD-10-CM

## 2024-08-08 DIAGNOSIS — K64.9 HEMORRHOIDS, UNSPECIFIED HEMORRHOID TYPE: ICD-10-CM

## 2024-08-08 DIAGNOSIS — Z98.890 HX OF COLONOSCOPY: ICD-10-CM

## 2024-08-08 DIAGNOSIS — Z79.01 CURRENT USE OF LONG TERM ANTICOAGULATION: ICD-10-CM

## 2024-08-08 DIAGNOSIS — K21.9 GASTROESOPHAGEAL REFLUX DISEASE, UNSPECIFIED WHETHER ESOPHAGITIS PRESENT: ICD-10-CM

## 2024-08-08 DIAGNOSIS — E66.01 CLASS 3 SEVERE OBESITY DUE TO EXCESS CALORIES WITH BODY MASS INDEX (BMI) OF 40.0 TO 44.9 IN ADULT, UNSPECIFIED WHETHER SERIOUS COMORBIDITY PRESENT (HCC): ICD-10-CM

## 2024-08-08 DIAGNOSIS — K62.5 RECTAL BLEEDING: Primary | ICD-10-CM

## 2024-08-08 PROCEDURE — G2211 COMPLEX E/M VISIT ADD ON: HCPCS | Performed by: NURSE PRACTITIONER

## 2024-08-08 PROCEDURE — 99214 OFFICE O/P EST MOD 30 MIN: CPT | Performed by: NURSE PRACTITIONER

## 2024-08-08 NOTE — PROGRESS NOTES
[facility] - Outpatient Follow-up Note  Kat Elise 74 y.o. female MRN: [id number]  Encounter: [id number]    ASSESSMENT AND PLAN:      1. Rectal bleeding  2. Hemorrhoids, unspecified hemorrhoid type  Patient notes she has had on and off rectal bleeding for approximately 2 months.  She did note increased bright red blood into the toilet.  She has seen some dark clots as well.  Discussed with patient that symptoms likely due to increased constipation and straining.  Reviewed importance of adequate fiber and fluids.  Important information attached to patient discharge summary regarding fiber intake.    -Discussed also using Preparation H or Tucks pads for inflamed hemorrhoids.  -Discussed if symptoms continue contact the office and we will need to schedule banding procedure.    3. Gastroesophageal reflux disease, unspecified whether esophagitis present  Patient states her acid reflux symptoms are adequately controlled with omeprazole 20 mg daily.    4. Constipation, unspecified constipation type  Patient does note increased constipation.  Discussed with patient adequate fiber and fluids and importance of not straining to have a bowel movement with history of rectal bleeding and taking daily anticoagulation medication.    -Discussed 30 g of fiber per day  -May use additional fiber supplementation i.e. Benefiber, fiber gummy, Metamucil and/or psyllium husk.  -Suggest MiraLAX, if no bowel movement by second day; 1/2-1 capful MiraLAX daily until bowel movement.  -Patient education regarding fiber intake attached to patient discharge summary    5. Current use of long term anticoagulation  Currently taking Xarelto daily for atrial fibrillation    6.  Class III severe obesity due to excess calories  Current BMI 43.55.  Current weight 223 pounds.    6. Hx of colonoscopy  1/24/2024; 5-year recall      Followup Appointment: As  needed  ______________________________________________________________________      HPI:    74-year-old female with past medical history of obesity, GERD and rectal bleeding presents for complaint of rectal bleeding for approximately 2 months.  Patient notes increased blood in the toilet.  She does note at times clotted blood as well as bright red blood.  Recent hemoglobin 12.1.  On exam, patient denies nausea, vomiting or abdominal pain.  Denies any acid reflux symptoms with taking omeprazole 20 mg daily.  Does note increased constipation.  Occasional hematochezia increased more recently and denies melena.  Non-smoker, occasional EtOH use.  Patient states her weight is stable.  Patient's last colonoscopy 2024 was done for rectal bleeding and noted lipoma in the cecum, diverticula, internal hemorrhoid, 5-year recall.      Historical Information   Past Medical History:   Diagnosis Date    Asthma     Atrial fibrillation (HCC)     CPAP (continuous positive airway pressure) dependence     GERD (gastroesophageal reflux disease)     History of diverticulitis     Hyperlipemia     Hypertension     Impaired fasting glucose     Sleep apnea      Past Surgical History:   Procedure Laterality Date    APPENDECTOMY      CARDIOVERSION       SECTION      COLON SURGERY      for obstrution    HYSTERECTOMY      TONSILLECTOMY      TUBAL LIGATION       Social History     Substance and Sexual Activity   Alcohol Use Yes    Comment: occasionally     Social History     Substance and Sexual Activity   Drug Use Yes     Social History     Tobacco Use   Smoking Status Never   Smokeless Tobacco Never     Family History   Problem Relation Age of Onset    Colon cancer Paternal Grandmother          Current Outpatient Medications:     amLODIPine (NORVASC) 5 mg tablet    atorvastatin (LIPITOR) 10 mg tablet    Fluticasone-Salmeterol (Advair Diskus) 250-50 mcg/dose inhaler    hydrochlorothiazide (HYDRODIURIL) 12.5 mg tablet     "metoprolol succinate (TOPROL-XL) 25 mg 24 hr tablet    metoprolol succinate (TOPROL-XL) 50 mg 24 hr tablet    omeprazole (PriLOSEC) 20 mg delayed release capsule    psyllium (METAMUCIL) packet    Xarelto 20 MG tablet    predniSONE 20 mg tablet    sodium picosulfate, magnesium oxide, citric acid (Clenpiq) oral solution    zaleplon (SONATA) 5 MG capsule  Allergies   Allergen Reactions    Iv Dye [Iodinated Contrast Media] Hives    Levofloxacin Other (See Comments) and Itching     itching    Zocor [Simvastatin] Swelling     Swelling in hands when she took generic zocor      Reviewed medications and allergies and updated as indicated    PHYSICAL EXAM:    Blood pressure 170/90, height 5' (1.524 m), weight 101 kg (223 lb). Body mass index is 43.55 kg/m².    Normal exam    General Appearance: NAD, cooperative, alert  Eyes: Anicteric, PERRLA, EOMI  ENT:  Normocephalic, atraumatic, normal mucosa.    Neck:  Supple, symmetrical, trachea midline  Resp:  Clear to auscultation bilaterally; no rales, rhonchi or wheezing; respirations unlabored   CV:  S1 S2, Regular rate and rhythm; no murmur, rub, or gallop.  GI:  Soft, non-tender, non-distended; normal bowel sounds; no masses, no organomegaly   Rectal: Deferred  Musculoskeletal: No cyanosis, clubbing or edema. Normal ROM.  Skin:  No jaundice, rashes, or lesions   Heme/Lymph: No palpable cervical lymphadenopathy  Psych: Normal affect, good eye contact  Neuro: No gross deficits, AAOx3    Lab Results:   No results found for: \"WBC\", \"HGB\", \"HCT\", \"MCV\", \"PLT\"  No results found for: \"NA\", \"K\", \"CL\", \"CO2\", \"ANIONGAP\", \"BUN\", \"CREATININE\", \"GLUCOSE\", \"GLUF\", \"CALCIUM\", \"CORRECTEDCA\", \"AST\", \"ALT\", \"ALKPHOS\", \"PROT\", \"BILITOT\", \"EGFR\"  No results found for: \"IRON\", \"TIBC\", \"FERRITIN\"  No results found for: \"LIPASE\"    Radiology Results:   No results found.  "

## 2024-08-08 NOTE — PATIENT INSTRUCTIONS
Food diary  30 fiber daily  Adequate fluids  May use supplement; Benefiber, fiber gummy, Metamucil,  psyllium husk    No bowel movement by second day, MiraLAX 1/2-1 capful daily until bowel movement    If you think you have an inflamed hemorrhoid, can trial Preparation H, Tucks pad

## 2024-08-29 ENCOUNTER — OFFICE VISIT (OUTPATIENT)
Dept: OBGYN CLINIC | Facility: CLINIC | Age: 74
End: 2024-08-29
Payer: MEDICARE

## 2024-08-29 VITALS
DIASTOLIC BLOOD PRESSURE: 82 MMHG | BODY MASS INDEX: 43.98 KG/M2 | WEIGHT: 224 LBS | HEIGHT: 60 IN | SYSTOLIC BLOOD PRESSURE: 138 MMHG

## 2024-08-29 DIAGNOSIS — M17.12 PRIMARY OSTEOARTHRITIS OF LEFT KNEE: Primary | ICD-10-CM

## 2024-08-29 PROCEDURE — 99213 OFFICE O/P EST LOW 20 MIN: CPT | Performed by: ORTHOPAEDIC SURGERY

## 2024-08-29 PROCEDURE — 20610 DRAIN/INJ JOINT/BURSA W/O US: CPT | Performed by: ORTHOPAEDIC SURGERY

## 2024-08-29 RX ORDER — BETAMETHASONE SODIUM PHOSPHATE AND BETAMETHASONE ACETATE 3; 3 MG/ML; MG/ML
6 INJECTION, SUSPENSION INTRA-ARTICULAR; INTRALESIONAL; INTRAMUSCULAR; SOFT TISSUE
Status: COMPLETED | OUTPATIENT
Start: 2024-08-29 | End: 2024-08-29

## 2024-08-29 RX ORDER — LIDOCAINE HYDROCHLORIDE 10 MG/ML
7 INJECTION, SOLUTION EPIDURAL; INFILTRATION; INTRACAUDAL; PERINEURAL
Status: COMPLETED | OUTPATIENT
Start: 2024-08-29 | End: 2024-08-29

## 2024-08-29 RX ADMIN — BETAMETHASONE SODIUM PHOSPHATE AND BETAMETHASONE ACETATE 6 MG: 3; 3 INJECTION, SUSPENSION INTRA-ARTICULAR; INTRALESIONAL; INTRAMUSCULAR; SOFT TISSUE at 10:45

## 2024-08-29 RX ADMIN — LIDOCAINE HYDROCHLORIDE 7 ML: 10 INJECTION, SOLUTION EPIDURAL; INFILTRATION; INTRACAUDAL; PERINEURAL at 10:45

## 2024-08-29 NOTE — ASSESSMENT & PLAN NOTE
Findings consistent with mild to moderate left knee osteoarthritis with possible degenerative medial meniscus tear.  Pes anserine bursitis has resolved.  Findings and treatment options were discussed with the patient.  Recommend cortisone injection to the left knee joint to reduce inflammation and pain at this time.  She tolerated the procedure well.  Advised to apply cold compress today.  Discussed importance of her continuing the home exercises taught by physical therapy.  Recommend ice with Voltaren gel as needed for pain.  Discussed that if she continues to have sharp pain with twisting despite cortisone injection, we may need to get an MRI of the left knee to further evaluate the joint.  If she has good results with the cortisone injection, she can have a repeat cortisone injection as soon as 3 to 4 months as needed.  Follow-up as needed if symptoms return.  All questions were answered to patient satisfaction.

## 2024-08-29 NOTE — PROGRESS NOTES
Assessment:     1. Primary osteoarthritis of left knee        Plan:     Problem List Items Addressed This Visit          Musculoskeletal and Integument    Primary osteoarthritis of left knee - Primary     Findings consistent with mild to moderate left knee osteoarthritis with possible degenerative medial meniscus tear.  Pes anserine bursitis has resolved.  Findings and treatment options were discussed with the patient.  Recommend cortisone injection to the left knee joint to reduce inflammation and pain at this time.  She tolerated the procedure well.  Advised to apply cold compress today.  Discussed importance of her continuing the home exercises taught by physical therapy.  Recommend ice with Voltaren gel as needed for pain.  Discussed that if she continues to have sharp pain with twisting despite cortisone injection, we may need to get an MRI of the left knee to further evaluate the joint.  If she has good results with the cortisone injection, she can have a repeat cortisone injection as soon as 3 to 4 months as needed.  Follow-up as needed if symptoms return.  All questions were answered to patient satisfaction.         Relevant Medications    betamethasone acetate-betamethasone sodium phosphate (CELESTONE) injection 6 mg (Completed)    lidocaine (PF) (XYLOCAINE-MPF) 1 % injection 7 mL (Completed)    Other Relevant Orders    Large joint arthrocentesis: L knee (Completed)      Subjective:     Patient ID: Kat Elise is a 74 y.o. female.  Chief Complaint:  This is a 74-year-old white female following up for left knee pes anserine bursitis and mild osteoarthritis.  She was seen 3 months ago and given a cortisone injection to the pes bursa and referred to formal physical therapy.  She states cortisone injection did help reduce pain in that area.  She completed the formal physical therapy with no issues.  During that time the aching pain at nighttime did not decrease.  She continues to have the same  symptoms that wake her up at nighttime.  She has tried putting a pillow in between her legs and it is hard for her to get comfortable.  She has minimal pain during the day.  It only bothers her when using stairs.    Allergy:  Allergies   Allergen Reactions    Iv Dye [Iodinated Contrast Media] Hives    Levofloxacin Other (See Comments) and Itching     itching    Zocor [Simvastatin] Swelling     Swelling in hands when she took generic zocor      Medications:  all current active meds have been reviewed  Past Medical History:  Past Medical History:   Diagnosis Date    Asthma     Atrial fibrillation (HCC)     CPAP (continuous positive airway pressure) dependence     GERD (gastroesophageal reflux disease)     History of diverticulitis     Hyperlipemia     Hypertension     Impaired fasting glucose     Sleep apnea      Past Surgical History:  Past Surgical History:   Procedure Laterality Date    APPENDECTOMY      CARDIOVERSION       SECTION      COLON SURGERY      for obstrution    HYSTERECTOMY      TONSILLECTOMY      TUBAL LIGATION       Family History:  Family History   Problem Relation Age of Onset    Colon cancer Paternal Grandmother      Social History:  Social History     Substance and Sexual Activity   Alcohol Use Yes    Comment: occasionally     Social History     Substance and Sexual Activity   Drug Use Yes     Social History     Tobacco Use   Smoking Status Never   Smokeless Tobacco Never     Review of Systems   Constitutional: Negative.    HENT: Negative.     Eyes: Negative.    Respiratory: Negative.     Cardiovascular: Negative.    Gastrointestinal: Negative.    Endocrine: Negative.    Genitourinary: Negative.    Musculoskeletal:  Positive for arthralgias (left knee). Negative for joint swelling.   Skin: Negative.    Allergic/Immunologic: Negative.    Neurological: Negative.    Hematological: Negative.    Psychiatric/Behavioral: Negative.           Objective:  BP Readings from Last 1 Encounters:    08/29/24 138/82      Wt Readings from Last 1 Encounters:   08/29/24 102 kg (224 lb)      BMI:   Estimated body mass index is 43.75 kg/m² as calculated from the following:    Height as of this encounter: 5' (1.524 m).    Weight as of this encounter: 102 kg (224 lb).  BSA:   Estimated body surface area is 1.96 meters squared as calculated from the following:    Height as of this encounter: 5' (1.524 m).    Weight as of this encounter: 102 kg (224 lb).   Physical Exam  Vitals and nursing note reviewed.   Constitutional:       General: She is not in acute distress.     Appearance: She is well-developed. She is obese.   HENT:      Head: Normocephalic and atraumatic.      Right Ear: External ear normal.      Left Ear: External ear normal.   Eyes:      Extraocular Movements: Extraocular movements intact.      Conjunctiva/sclera: Conjunctivae normal.   Pulmonary:      Effort: Pulmonary effort is normal. No respiratory distress.   Musculoskeletal:      Cervical back: Neck supple.      Left knee: No effusion.      Instability Tests: Medial Loree test negative and lateral Loree test negative.   Skin:     General: Skin is warm and dry.   Neurological:      Mental Status: She is alert and oriented to person, place, and time.      Deep Tendon Reflexes: Reflexes are normal and symmetric.   Psychiatric:         Mood and Affect: Mood normal.         Behavior: Behavior normal.       Left Knee Exam     Tenderness   The patient is experiencing tenderness in the pes anserinus and medial joint line.    Range of Motion   The patient has normal left knee ROM.    Tests   Loree:  Medial - negative Lateral - negative  Varus: negative Valgus: negative  Patellar apprehension: negative    Other   Erythema: absent  Scars: absent  Sensation: normal  Pulse: present  Swelling: none  Effusion: no effusion present            No new imaging.    Large joint arthrocentesis: L knee  Universal Protocol:  Consent: Verbal consent obtained.  Risks  and benefits: risks, benefits and alternatives were discussed  Consent given by: patient  Patient understanding: patient states understanding of the procedure being performed  Site marked: the operative site was marked  Supporting Documentation  Indications: pain   Procedure Details  Location: knee - L knee  Preparation: Patient was prepped and draped in the usual sterile fashion  Needle size: 22 G  Ultrasound guidance: no  Approach: anterolateral  Medications administered: 6 mg betamethasone acetate-betamethasone sodium phosphate 6 (3-3) mg/mL; 7 mL lidocaine (PF) 1 %    Patient tolerance: patient tolerated the procedure well with no immediate complications  Dressing:  Sterile dressing applied        Scribe Attestation      I,:  Katie Beal PA-C am acting as a scribe while in the presence of the attending physician.:       I,:  Jorje Ordoñez MD personally performed the services described in this documentation    as scribed in my presence.:

## 2024-12-19 ENCOUNTER — APPOINTMENT (OUTPATIENT)
Dept: RADIOLOGY | Facility: CLINIC | Age: 74
End: 2024-12-19
Payer: MEDICARE

## 2024-12-19 DIAGNOSIS — M54.50 LOW BACK PAIN, UNSPECIFIED BACK PAIN LATERALITY, UNSPECIFIED CHRONICITY, UNSPECIFIED WHETHER SCIATICA PRESENT: ICD-10-CM

## 2024-12-19 PROCEDURE — 72100 X-RAY EXAM L-S SPINE 2/3 VWS: CPT

## 2025-01-26 ENCOUNTER — APPOINTMENT (EMERGENCY)
Dept: CT IMAGING | Facility: HOSPITAL | Age: 75
DRG: 309 | End: 2025-01-26
Payer: MEDICARE

## 2025-01-26 ENCOUNTER — HOSPITAL ENCOUNTER (INPATIENT)
Facility: HOSPITAL | Age: 75
LOS: 2 days | Discharge: HOME | DRG: 309 | End: 2025-01-28
Attending: EMERGENCY MEDICINE | Admitting: FAMILY MEDICINE
Payer: MEDICARE

## 2025-01-26 ENCOUNTER — APPOINTMENT (OUTPATIENT)
Dept: RADIOLOGY | Facility: HOSPITAL | Age: 75
DRG: 309 | End: 2025-01-26
Payer: MEDICARE

## 2025-01-26 DIAGNOSIS — I48.91 ATRIAL FIBRILLATION WITH RAPID VENTRICULAR RESPONSE (HCC): ICD-10-CM

## 2025-01-26 DIAGNOSIS — I50.9 CHF (CONGESTIVE HEART FAILURE) (HCC): Primary | ICD-10-CM

## 2025-01-26 DIAGNOSIS — R06.02 SOB (SHORTNESS OF BREATH): ICD-10-CM

## 2025-01-26 PROBLEM — R06.00 DYSPNEA: Status: ACTIVE | Noted: 2025-01-26

## 2025-01-26 PROBLEM — E78.5 HYPERLIPIDEMIA: Status: ACTIVE | Noted: 2025-01-26

## 2025-01-26 PROBLEM — I10 HYPERTENSION: Status: ACTIVE | Noted: 2025-01-26

## 2025-01-26 LAB
2HR DELTA HS TROPONIN: -1 NG/L
ALBUMIN SERPL BCG-MCNC: 3.9 G/DL (ref 3.5–5)
ALP SERPL-CCNC: 81 U/L (ref 34–104)
ALT SERPL W P-5'-P-CCNC: 14 U/L (ref 7–52)
ANION GAP SERPL CALCULATED.3IONS-SCNC: 8 MMOL/L (ref 4–13)
APTT PPP: 31 SECONDS (ref 23–34)
AST SERPL W P-5'-P-CCNC: 12 U/L (ref 13–39)
BASOPHILS # BLD AUTO: 0.05 THOUSANDS/ΜL (ref 0–0.1)
BASOPHILS NFR BLD AUTO: 1 % (ref 0–1)
BILIRUB SERPL-MCNC: 0.4 MG/DL (ref 0.2–1)
BNP SERPL-MCNC: 424 PG/ML (ref 0–100)
BUN SERPL-MCNC: 13 MG/DL (ref 5–25)
CALCIUM SERPL-MCNC: 9.4 MG/DL (ref 8.4–10.2)
CARDIAC TROPONIN I PNL SERPL HS: 3 NG/L (ref ?–50)
CARDIAC TROPONIN I PNL SERPL HS: 4 NG/L (ref ?–50)
CHLORIDE SERPL-SCNC: 106 MMOL/L (ref 96–108)
CO2 SERPL-SCNC: 26 MMOL/L (ref 21–32)
CREAT SERPL-MCNC: 0.74 MG/DL (ref 0.6–1.3)
EOSINOPHIL # BLD AUTO: 0.12 THOUSAND/ΜL (ref 0–0.61)
EOSINOPHIL NFR BLD AUTO: 2 % (ref 0–6)
ERYTHROCYTE [DISTWIDTH] IN BLOOD BY AUTOMATED COUNT: 15 % (ref 11.6–15.1)
GFR SERPL CREATININE-BSD FRML MDRD: 81 ML/MIN/1.73SQ M
GLUCOSE SERPL-MCNC: 113 MG/DL (ref 65–140)
HCT VFR BLD AUTO: 38.8 % (ref 34.8–46.1)
HGB BLD-MCNC: 12 G/DL (ref 11.5–15.4)
IMM GRANULOCYTES # BLD AUTO: 0.02 THOUSAND/UL (ref 0–0.2)
IMM GRANULOCYTES NFR BLD AUTO: 0 % (ref 0–2)
INR PPP: 1.35 (ref 0.85–1.19)
LYMPHOCYTES # BLD AUTO: 2.49 THOUSANDS/ΜL (ref 0.6–4.47)
LYMPHOCYTES NFR BLD AUTO: 35 % (ref 14–44)
MCH RBC QN AUTO: 26.7 PG (ref 26.8–34.3)
MCHC RBC AUTO-ENTMCNC: 30.9 G/DL (ref 31.4–37.4)
MCV RBC AUTO: 86 FL (ref 82–98)
MONOCYTES # BLD AUTO: 0.8 THOUSAND/ΜL (ref 0.17–1.22)
MONOCYTES NFR BLD AUTO: 11 % (ref 4–12)
NEUTROPHILS # BLD AUTO: 3.7 THOUSANDS/ΜL (ref 1.85–7.62)
NEUTS SEG NFR BLD AUTO: 51 % (ref 43–75)
NRBC BLD AUTO-RTO: 0 /100 WBCS
PLATELET # BLD AUTO: 350 THOUSANDS/UL (ref 149–390)
PMV BLD AUTO: 9.5 FL (ref 8.9–12.7)
POTASSIUM SERPL-SCNC: 4 MMOL/L (ref 3.5–5.3)
PROT SERPL-MCNC: 7 G/DL (ref 6.4–8.4)
PROTHROMBIN TIME: 17.2 SECONDS (ref 12.3–15)
RBC # BLD AUTO: 4.5 MILLION/UL (ref 3.81–5.12)
SODIUM SERPL-SCNC: 140 MMOL/L (ref 135–147)
TSH SERPL DL<=0.05 MIU/L-ACNC: 1.7 UIU/ML (ref 0.45–4.5)
WBC # BLD AUTO: 7.18 THOUSAND/UL (ref 4.31–10.16)

## 2025-01-26 PROCEDURE — 71250 CT THORAX DX C-: CPT

## 2025-01-26 PROCEDURE — 85730 THROMBOPLASTIN TIME PARTIAL: CPT

## 2025-01-26 PROCEDURE — 84484 ASSAY OF TROPONIN QUANT: CPT

## 2025-01-26 PROCEDURE — 99223 1ST HOSP IP/OBS HIGH 75: CPT | Performed by: PHYSICIAN ASSISTANT

## 2025-01-26 PROCEDURE — 71046 X-RAY EXAM CHEST 2 VIEWS: CPT

## 2025-01-26 PROCEDURE — 85610 PROTHROMBIN TIME: CPT

## 2025-01-26 PROCEDURE — 99285 EMERGENCY DEPT VISIT HI MDM: CPT

## 2025-01-26 PROCEDURE — 83735 ASSAY OF MAGNESIUM: CPT | Performed by: INTERNAL MEDICINE

## 2025-01-26 PROCEDURE — 96374 THER/PROPH/DIAG INJ IV PUSH: CPT

## 2025-01-26 PROCEDURE — 93005 ELECTROCARDIOGRAM TRACING: CPT

## 2025-01-26 PROCEDURE — 84443 ASSAY THYROID STIM HORMONE: CPT | Performed by: PHYSICIAN ASSISTANT

## 2025-01-26 PROCEDURE — 96375 TX/PRO/DX INJ NEW DRUG ADDON: CPT

## 2025-01-26 PROCEDURE — 83880 ASSAY OF NATRIURETIC PEPTIDE: CPT

## 2025-01-26 PROCEDURE — 85025 COMPLETE CBC W/AUTO DIFF WBC: CPT

## 2025-01-26 PROCEDURE — 80053 COMPREHEN METABOLIC PANEL: CPT

## 2025-01-26 PROCEDURE — 36415 COLL VENOUS BLD VENIPUNCTURE: CPT

## 2025-01-26 RX ORDER — AMLODIPINE BESYLATE 2.5 MG/1
2.5 TABLET ORAL 2 TIMES DAILY
Status: DISCONTINUED | OUTPATIENT
Start: 2025-01-26 | End: 2025-01-28 | Stop reason: HOSPADM

## 2025-01-26 RX ORDER — FUROSEMIDE 10 MG/ML
20 INJECTION INTRAMUSCULAR; INTRAVENOUS ONCE
Status: DISCONTINUED | OUTPATIENT
Start: 2025-01-26 | End: 2025-01-26

## 2025-01-26 RX ORDER — FUROSEMIDE 10 MG/ML
40 INJECTION INTRAMUSCULAR; INTRAVENOUS ONCE
Status: COMPLETED | OUTPATIENT
Start: 2025-01-26 | End: 2025-01-26

## 2025-01-26 RX ORDER — FLUTICASONE FUROATE AND VILANTEROL 200; 25 UG/1; UG/1
1 POWDER RESPIRATORY (INHALATION)
Status: DISCONTINUED | OUTPATIENT
Start: 2025-01-26 | End: 2025-01-28 | Stop reason: HOSPADM

## 2025-01-26 RX ORDER — HYDROCHLOROTHIAZIDE 12.5 MG/1
12.5 TABLET ORAL DAILY
Status: DISCONTINUED | OUTPATIENT
Start: 2025-01-27 | End: 2025-01-27

## 2025-01-26 RX ORDER — PANTOPRAZOLE SODIUM 20 MG/1
20 TABLET, DELAYED RELEASE ORAL
Status: DISCONTINUED | OUTPATIENT
Start: 2025-01-27 | End: 2025-01-28 | Stop reason: HOSPADM

## 2025-01-26 RX ORDER — ATORVASTATIN CALCIUM 10 MG/1
10 TABLET, FILM COATED ORAL
Status: DISCONTINUED | OUTPATIENT
Start: 2025-01-26 | End: 2025-01-28 | Stop reason: HOSPADM

## 2025-01-26 RX ORDER — LEVALBUTEROL INHALATION SOLUTION 0.63 MG/3ML
0.31 SOLUTION RESPIRATORY (INHALATION) EVERY 6 HOURS PRN
Status: DISCONTINUED | OUTPATIENT
Start: 2025-01-26 | End: 2025-01-27

## 2025-01-26 RX ORDER — DILTIAZEM HYDROCHLORIDE 5 MG/ML
10 INJECTION INTRAVENOUS ONCE
Status: COMPLETED | OUTPATIENT
Start: 2025-01-26 | End: 2025-01-26

## 2025-01-26 RX ADMIN — METOPROLOL TARTRATE 37.5 MG: 25 TABLET, FILM COATED ORAL at 17:23

## 2025-01-26 RX ADMIN — RIVAROXABAN 20 MG: 10 TABLET, FILM COATED ORAL at 16:38

## 2025-01-26 RX ADMIN — ATORVASTATIN CALCIUM 10 MG: 10 TABLET, FILM COATED ORAL at 16:38

## 2025-01-26 RX ADMIN — AMLODIPINE BESYLATE 2.5 MG: 2.5 TABLET ORAL at 20:26

## 2025-01-26 RX ADMIN — FUROSEMIDE 40 MG: 10 INJECTION, SOLUTION INTRAVENOUS at 12:59

## 2025-01-26 RX ADMIN — FLUTICASONE FUROATE AND VILANTEROL TRIFENATATE 1 PUFF: 200; 25 POWDER RESPIRATORY (INHALATION) at 17:14

## 2025-01-26 RX ADMIN — DILTIAZEM HYDROCHLORIDE 10 MG: 5 INJECTION, SOLUTION INTRAVENOUS at 12:57

## 2025-01-26 NOTE — H&P
"H&P - Hospitalist   Name: Kat Elise 74 y.o. female I MRN: [id number]  Unit/Bed#: ED 11 I Date of Admission: 1/26/2025   Date of Service: 1/26/2025 I Hospital Day: 0     Assessment & Plan  Rapid atrial fibrillation (HCC)  Presents with SOB and alerts for irregular HR on her BP cuff at home  HR averaging 120s on arrival, noted to be in rapid Afib  Likely precipitated by Flu infx  Pt reports successful cardioversion at  in September 2024, has been in sinus as far as she knows until this week  Compliant with Metop XL 75 mg BID and Xarelto 20 mg daily   S/p IV Cardizem 10 mg push in the ED with improvement  Will fractionate home Metoprolol to Lopressor 37.5 mg Q6h  Monitor on tele  Cardiology consult to follow   Obesity, morbid (HCC)    PILAR (obstructive sleep apnea)  Continue CPAP hs  Moderate persistent asthma without complication  Continue Breo IH daily as substitute for once daily Advair  Slight expiratory wheeze on exam  Will start nebs, hold off on steroids due to rapid Afib  Dyspnea  Worsening SOB and ROMERO since flu infection  Breathing comfortably on RA, no O2 requirement  Unclear if this is truly new HF vs less likely asthmas exacerbation given wheeze heard on exam  Has not recovered from a respiratory standpoint since flu infection approx a week ago,   No LE edema, no orthopnea, no JVD, no crackles heard  BNP elevated at 424, no prior BNPs found for comparison  CXR with no acute consolidation or congestion, mild cardiomegaly  CT chest : Small reticulonodular opacity in the periphery of the left lower lobe. Otherwise no areas of suspicious mass or consolidation  Pt reports recent echo was \"normal\", cannot verify this as difficulty accessing [place] [facility] records  S/p IV Lasix 40 mg x1 in the ED, reports good UOP but is in ED bedhold with no urine charted  Monitoring off steroids given rapid AFib  Obtain echo  Cardiology consulted, appreciate their assistance  Monitor UOP closely, daily standing weights "   Pt is lasix naive, will monitor response and diurese as needed for now  Trial nebs, respiratory protocol   Hypertension  BP's acceptable thusfar  Fractionating metoprolol for HR control, continue HCTZ 12.5 mg daily and amlodipine 2.5 mg BID  Monitor BP as also received IV Lasix x1  Hyperlipidemia  Continue home statin      VTE Pharmacologic Prophylaxis:   Moderate Risk (Score 3-4) - Pharmacological DVT Prophylaxis Ordered: rivaroxaban (Xarelto).  Code Status: Level 1 - Full Code   Discussion with family: Patient declined call to .     Anticipated Length of Stay: Patient will be admitted on an inpatient basis with an anticipated length of stay of greater than 2 midnights secondary to rapid afib, possible volume overload.    History of Present Illness   Chief Complaint: KIMANI Elise is a 74 y.o. female with a PMH of HTN, HLD, Afib on Xarelto, PILAR, asthma who presents with worsening shortness of breath and dyspnea on exertion following recent flu infection.  Patient had the flu little bit over a week ago and her dyspnea has not improved.  She also had a feeling she was possibly in A-fib.  She reports that she uses a blood pressure cuff daily and that it can alert her to possible arrhythmia.  She says 3 days in a row it alerted her to possible arrhythmia at which point she decided to present to the ED.  She reports watching her salt intake and practices dietary discretion.  She does report increasing her oral fluid intake to avoid dehydration in the setting of flu.  She has had good oral intake for the past week.  She denies any chest pain.  She admits to dyspnea worse with exertion but does also have some shortness of breath at rest at times.  She has not noticed any swelling of her lower extremities.  She sleeps with the head of her bed elevated nightly and has not noticed orthopnea.  She did notice some eyelid puffiness that has improved since receiving Lasix in the ED.  Patient  reports recent cardioversion in September and says that she has been in sinus rhythm since then.  She even thought she might have flipped back into A-fib but upon visit to the cardiology office, they told her she was still in sinus.  She also thinks she had a recent echo with no mentions of heart failure.  She follows with Dr. Henley at [facility].  She reports compliance with her metoprolol and Xarelto.  Denies any recent fevers or chills.  She has a bit of a cough but says it is unchanged for the past week or so.  She denies headache or dizziness.  Denies syncope no recent alcohol use.  Compliant with her daily maintenance inhaler..    Review of Systems   Constitutional:  Negative for activity change, appetite change, chills, diaphoresis and fever.   HENT: Negative.     Eyes: Negative.    Respiratory:  Positive for cough and shortness of breath.    Cardiovascular:  Negative for chest pain, palpitations and leg swelling.   Gastrointestinal:  Negative for abdominal pain, diarrhea, nausea and vomiting.   Endocrine: Negative.    Genitourinary: Negative.    Musculoskeletal: Negative.    Skin: Negative.    Neurological:  Negative for weakness, light-headedness and headaches.   Hematological: Negative.    Psychiatric/Behavioral: Negative.         Historical Information   Past Medical History:   Diagnosis Date    Asthma     Atrial fibrillation (HCC)     CPAP (continuous positive airway pressure) dependence     GERD (gastroesophageal reflux disease)     History of diverticulitis     Hyperlipemia     Hypertension     Impaired fasting glucose     Sleep apnea      Past Surgical History:   Procedure Laterality Date    APPENDECTOMY      CARDIOVERSION       SECTION      COLON SURGERY      for obstrution    HYSTERECTOMY      TONSILLECTOMY      TUBAL LIGATION       Social History     Tobacco Use    Smoking status: Never    Smokeless tobacco: Never   Vaping Use    Vaping status: Never Used   Substance  and Sexual Activity    Alcohol use: Yes     Comment: occasionally    Drug use: Yes    Sexual activity: Not Currently     Partners: Male     E-Cigarette/Vaping    E-Cigarette Use Never User      E-Cigarette/Vaping Substances     Family history non-contributory  Social History:  Marital Status: /Civil Union   Occupation: not discussed  Patient Pre-hospital Living Situation: Home  Patient Pre-hospital Level of Mobility: walks  Patient Pre-hospital Diet Restrictions: not discussed    Meds/Allergies   I have reviewed home medications with patient personally.  Prior to Admission medications    Medication Sig Start Date End Date Taking? Authorizing Provider   amLODIPine (NORVASC) 5 mg tablet 2.5 in am and 2.5 in pm   Yes Historical Provider, MD   atorvastatin (LIPITOR) 10 mg tablet    Yes Historical Provider, MD   Fluticasone-Salmeterol (Advair Diskus) 250-50 mcg/dose inhaler    Yes Historical Provider, MD   hydrochlorothiazide (HYDRODIURIL) 12.5 mg tablet Take 12.5 mg by mouth daily 11/20/23  Yes Historical Provider, MD   metoprolol succinate (TOPROL-XL) 25 mg 24 hr tablet TAKE 1 TABLET BY MOUTH TWICE A DAY IN ADDITION TO THE 50MG TABLET TWICE A DAY 11/20/23  Yes Historical Provider, MD   metoprolol succinate (TOPROL-XL) 50 mg 24 hr tablet    Yes Historical Provider, MD   omeprazole (PriLOSEC) 20 mg delayed release capsule TAKE 1 CAPSULE BY MOUTH EVERY DAY 30 MINUTES BEFORE MORNING MEAL 9/24/23  Yes Historical Provider, MD   Xarelto 20 MG tablet    Yes Historical Provider, MD   predniSONE 20 mg tablet PLEASE SEE ATTACHED FOR DETAILED DIRECTIONS 11/6/23   Historical Provider, MD   psyllium (METAMUCIL) packet Take 1 packet by mouth daily    Historical Provider, MD   sodium picosulfate, magnesium oxide, citric acid (Clenpiq) oral solution Take 175 mL (1 bottle) the evening before the colonoscopy, between 5 PM and 9 PM, followed by a second 175 mL bottle 5 hours before the colonoscopy. 11/28/23   Keon Gallegos MD    zaleplon (SONATA) 5 MG capsule Take 1 capsule (5 mg total) by mouth daily at bedtime On the night of the sleep study ONLY 2/7/24 1/26/25  Galileo Day MD     Allergies   Allergen Reactions    Iv Dye [Iodinated Contrast Media] Hives    Levofloxacin Other (See Comments) and Itching     itching    Zocor [Simvastatin] Swelling     Swelling in hands when she took generic zocor        Objective :  Temp:  [98.1 °F (36.7 °C)-98.3 °F (36.8 °C)] 98.3 °F (36.8 °C)  HR:  [103-132] 132  BP: (130-160)/(69-93) 139/83  Resp:  [18-20] 20  SpO2:  [95 %-98 %] 97 %    Physical Exam  Vitals and nursing note reviewed.   Constitutional:       General: She is not in acute distress.     Appearance: Normal appearance. She is obese. She is not ill-appearing or toxic-appearing.   HENT:      Head: Normocephalic and atraumatic.      Nose: Nose normal.      Mouth/Throat:      Mouth: Mucous membranes are moist.      Pharynx: Oropharynx is clear.   Eyes:      Conjunctiva/sclera: Conjunctivae normal.   Cardiovascular:      Rate and Rhythm: Tachycardia present. Rhythm irregular.      Pulses: Normal pulses.      Heart sounds: No murmur heard.     No gallop.   Pulmonary:      Effort: Pulmonary effort is normal. No respiratory distress.      Breath sounds: No stridor. Wheezing present. No rales.      Comments: Slight expiratory wheeze heard bilaterally  Abdominal:      General: Abdomen is flat. Bowel sounds are normal. There is no distension.      Palpations: Abdomen is soft. There is no mass.      Tenderness: There is no abdominal tenderness. There is no guarding or rebound.      Hernia: No hernia is present.   Musculoskeletal:      Cervical back: Normal range of motion.      Right lower leg: No edema.      Left lower leg: No edema.   Skin:     General: Skin is warm and dry.      Capillary Refill: Capillary refill takes less than 2 seconds.   Neurological:      Mental Status: She is alert. Mental status is at baseline.   Psychiatric:         Mood  "and Affect: Mood normal.          Lines/Drains:            Lab Results: I have reviewed the following results:  Results from last 7 days   Lab Units 01/26/25  1038   WBC Thousand/uL 7.18   HEMOGLOBIN g/dL 12.0   HEMATOCRIT % 38.8   PLATELETS Thousands/uL 350   SEGS PCT % 51   LYMPHO PCT % 35   MONO PCT % 11   EOS PCT % 2     Results from last 7 days   Lab Units 01/26/25  1038   SODIUM mmol/L 140   POTASSIUM mmol/L 4.0   CHLORIDE mmol/L 106   CO2 mmol/L 26   BUN mg/dL 13   CREATININE mg/dL 0.74   ANION GAP mmol/L 8   CALCIUM mg/dL 9.4   ALBUMIN g/dL 3.9   TOTAL BILIRUBIN mg/dL 0.40   ALK PHOS U/L 81   ALT U/L 14   AST U/L 12*   GLUCOSE RANDOM mg/dL 113     Results from last 7 days   Lab Units 01/26/25  1038   INR  1.35*         No results found for: \"HGBA1C\"        Imaging Results Review: I reviewed radiology reports from this admission including: chest xray and CT chest.  Other Study Results Review: EKG was reviewed.     Administrative Statements   I have spent a total time of 60 minutes in caring for this patient on the day of the visit/encounter including Diagnostic results, Patient and family education, Impressions, Counseling / Coordination of care, Documenting in the medical record, Reviewing / ordering tests, medicine, procedures  , Obtaining or reviewing history  , and Communicating with other healthcare professionals .    ** Please Note: This note has been constructed using a voice recognition system. **    "

## 2025-01-26 NOTE — ASSESSMENT & PLAN NOTE
Continue Breo IH daily as substitute for once daily Advair  Slight expiratory wheeze on exam  Will start nebs, hold off on steroids due to rapid Afib

## 2025-01-26 NOTE — ASSESSMENT & PLAN NOTE
BP's acceptable thusfar  Fractionating metoprolol for HR control, continue HCTZ 12.5 mg daily and amlodipine 2.5 mg BID  Monitor BP as also received IV Lasix x1

## 2025-01-26 NOTE — ASSESSMENT & PLAN NOTE
Presents with SOB and alerts for irregular HR on her BP cuff at home  HR averaging 120s on arrival, noted to be in rapid Afib  Likely precipitated by Flu infx  Pt reports successful cardioversion at  in September 2024, has been in sinus as far as she knows until this week  Compliant with Metop XL 75 mg BID and Xarelto 20 mg daily   S/p IV Cardizem 10 mg push in the ED with improvement  Will fractionate home Metoprolol to Lopressor 37.5 mg Q6h  Monitor on tele  Cardiology consult to follow

## 2025-01-26 NOTE — ED NOTES
Primary RN sent report via SecureChat to Emily Norton RN on MS 3.      Dorothy Alejandre RN  01/26/25 [id number]

## 2025-01-26 NOTE — ASSESSMENT & PLAN NOTE
"Worsening SOB and ROMERO since flu infection  Breathing comfortably on RA, no O2 requirement  Unclear if this is truly new HF vs less likely asthmas exacerbation given wheeze heard on exam  Has not recovered from a respiratory standpoint since flu infection approx a week ago,   No LE edema, no orthopnea, no JVD, no crackles heard  BNP elevated at 424, no prior BNPs found for comparison  CXR with no acute consolidation or congestion, mild cardiomegaly  CT chest : Small reticulonodular opacity in the periphery of the left lower lobe. Otherwise no areas of suspicious mass or consolidation  Pt reports recent echo was \"normal\", cannot verify this as difficulty accessing [facility] records  S/p IV Lasix 40 mg x1 in the ED, reports good UOP but is in ED bedhold with no urine charted  Monitoring off steroids given rapid AFib  Obtain echo  Cardiology consulted, appreciate their assistance  Monitor UOP closely, daily standing weights   Pt is lasix naive, will monitor response and diurese as needed for now  Trial nebs, respiratory protocol   "

## 2025-01-26 NOTE — ED PROVIDER NOTES
Time reflects when diagnosis was documented in both MDM as applicable and the Disposition within this note       Time User Action Codes Description Comment    1/26/2025  1:41 PM Shiela Driver Add [I50.9] CHF (congestive heart failure) (HCC)     1/26/2025  1:41 PM Shiela Driver Add [R06.02] SOB (shortness of breath)     1/26/2025  1:42 PM Shiela Driver Add [I48.92] Atrial flutter with rapid ventricular response (HCC)     1/26/2025  1:42 PM Shiela Driver Remove [I48.92] Atrial flutter with rapid ventricular response (HCC)     1/26/2025  1:42 PM Shiela Driver Add [I48.91] Atrial fibrillation with rapid ventricular response (HCC)           ED Disposition       ED Disposition   Admit    Condition   Stable    Date/Time   Sun Jan 26, 2025  1:41 PM    Comment   Case was discussed with Dr. Parham and the patient's admission status was agreed to be Admission Status: inpatient status to the service of Dr. Parham .               Assessment & Plan       Medical Decision Making  DDx: pneumonia, CHF, electrolyte abnormality, afib   Patient presenting with history of afib, stating he is compliant with her medications.  Patient placed on telemetry when she arrived to the ED bed and seemed to be in A-fib with RVR.  Patient given Cardizem bolus with appropriate response.  First nurse workup in the waiting room shows no leukocytosis, no anemia, no HOLLY. Heart score of 4. Symptoms started three days ago. Will continue to trend. Mild elevation to BNP. No prior to compare. CT Chest obtained with normal chest xray. Reviewed incidental finding and aware to follow up with PCP for this, and without appropriate follow up diagnosis such as but not limited to cancer can be missed. Low suspicion for PE as patient is on Xarelto with no missed doses.   As patient has no history of HF will plan to admit given patient's reports of worsening SOB. Likely following acute viral infection. Lasix provided. Case discussed with   Prasad and patient admitted.     Amount and/or Complexity of Data Reviewed  Radiology: ordered.    Risk  Prescription drug management.  Decision regarding hospitalization.             Medications   amLODIPine (NORVASC) tablet 2.5 mg (has no administration in time range)   atorvastatin (LIPITOR) tablet 10 mg (10 mg Oral Given 1/26/25 1638)   fluticasone-vilanterol 200-25 mcg/actuation 1 puff (1 puff Inhalation Given 1/26/25 1714)   hydroCHLOROthiazide tablet 12.5 mg (has no administration in time range)   pantoprazole (PROTONIX) EC tablet 20 mg (has no administration in time range)   rivaroxaban (XARELTO) tablet 20 mg (20 mg Oral Given 1/26/25 1638)   metoprolol tartrate (LOPRESSOR) tablet 37.5 mg (37.5 mg Oral Given 1/26/25 1723)   levalbuterol (XOPENEX) inhalation solution 0.31 mg (has no administration in time range)   diltiazem (CARDIZEM) injection 10 mg (10 mg Intravenous Given 1/26/25 1257)   furosemide (LASIX) injection 40 mg (40 mg Intravenous Given 1/26/25 1259)       ED Risk Strat Scores   HEART Risk Score      Flowsheet Row Most Recent Value   Heart Score Risk Calculator    History 0 Filed at: 01/26/2025 1305   ECG 1 Filed at: 01/26/2025 1305   Age 2 Filed at: 01/26/2025 1305   Risk Factors 1 Filed at: 01/26/2025 1305   Troponin 0 Filed at: 01/26/2025 1305   HEART Score 4 Filed at: 01/26/2025 1305          HEART Risk Score      Flowsheet Row Most Recent Value   Heart Score Risk Calculator    History 0 Filed at: 01/26/2025 1305   ECG 1 Filed at: 01/26/2025 1305   Age 2 Filed at: 01/26/2025 1305   Risk Factors 1 Filed at: 01/26/2025 1305   Troponin 0 Filed at: 01/26/2025 1305   HEART Score 4 Filed at: 01/26/2025 1305                            SBIRT 20yo+      Flowsheet Row Most Recent Value   Initial Alcohol Screen: US AUDIT-C     1. How often do you have a drink containing alcohol? 0 Filed at: 01/26/2025 1303   2. How many drinks containing alcohol do you have on a typical day you are drinking?  0 Filed  "at: 2025 1303   3a. Male UNDER 65: How often do you have five or more drinks on one occasion? 0 Filed at: 2025 1303   3b. FEMALE Any Age, or MALE 65+: How often do you have 4 or more drinks on one occassion? 0 Filed at: 2025 1303   Audit-C Score 0 Filed at: 2025 1303   TONY: How many times in the past year have you...    Used an illegal drug or used a prescription medication for non-medical reasons? Never Filed at: 2025 1303                            History of Present Illness       Chief Complaint   Patient presents with    Shortness of Breath     Pt repots sob for a week, pt also says he bp cuff at home and saying she is in a-fib, pt reports some dizziness. Pt denies palpations.        Past Medical History:   Diagnosis Date    Asthma     Atrial fibrillation (HCC)     CPAP (continuous positive airway pressure) dependence     GERD (gastroesophageal reflux disease)     History of diverticulitis     Hyperlipemia     Hypertension     Impaired fasting glucose     Sleep apnea       Past Surgical History:   Procedure Laterality Date    APPENDECTOMY      CARDIOVERSION       SECTION      COLON SURGERY      for obstrution    HYSTERECTOMY      TONSILLECTOMY      TUBAL LIGATION        Family History   Problem Relation Age of Onset    Colon cancer Paternal Grandmother       Social History     Tobacco Use    Smoking status: Never    Smokeless tobacco: Never   Vaping Use    Vaping status: Never Used   Substance Use Topics    Alcohol use: Yes     Comment: occasionally    Drug use: Yes      E-Cigarette/Vaping    E-Cigarette Use Never User       E-Cigarette/Vaping Substances      I have reviewed and agree with the history as documented.     Patient is a 75 yo F pmhx of afib, htn arriving for evaluation of shortness of breath. Patient states started with flu on .  tested positive and she started with symptoms. Patient endorses, \"Just not bouncing back.\" Patient states over " "couple short of breath with cough clear sputum. Patient states doing blood pressure, telling her she's in a dysrhythmia and grew concerned with three days in a row of a \"level three reading.\" Patient states no misses of metoprolol or Xarelto. Patient states no history of HF--takes HCTZ 12.5 mg no misses. No weight gain she is aware of. No leg swelling. Reports feels bilateral eye lid swelling. Shortness of breath brought to ED, even while at rest, one pillow, bed elevated, has a cpap at home for sleep apnea.               Review of Systems   Constitutional: Negative.    HENT: Negative.     Eyes: Negative.    Respiratory:  Positive for cough and shortness of breath.    Cardiovascular: Negative.    Gastrointestinal: Negative.    Endocrine: Negative.    Genitourinary: Negative.    Musculoskeletal: Negative.    Allergic/Immunologic: Negative.    Neurological: Negative.    Hematological: Negative.    Psychiatric/Behavioral: Negative.     All other systems reviewed and are negative.          Objective       ED Triage Vitals   Temperature Pulse Blood Pressure Respirations SpO2 Patient Position - Orthostatic VS   01/26/25 1029 01/26/25 1029 01/26/25 1029 01/26/25 1029 01/26/25 1029 --   98.1 °F (36.7 °C) (!) 108 133/93 18 97 %       Temp Source Heart Rate Source BP Location FiO2 (%) Pain Score    01/26/25 1530 01/26/25 1200 -- -- --    Oral Monitor         Vitals      Date and Time Temp Pulse SpO2 Resp BP Pain Score FACES Pain Rating User   01/26/25 1900 -- 107 96 % 20 142/72 -- -- RD   01/26/25 1800 -- 117 96 % 16 139/88 -- -- RD 01/26/25 1730 -- 119 96 % 18 145/80 -- -- RD 01/26/25 1600 -- 132 97 % 20 139/83 -- -- RD 01/26/25 1530 98.3 °F (36.8 °C) 115 96 % 20 148/82 -- -- RD 01/26/25 1515 -- 112 97 % 20 136/85 -- -- RD   01/26/25 1430 -- 110 97 % 20 130/81 -- -- RD 01/26/25 1400 -- 103 97 % 20 133/84 -- --    01/26/25 1345 -- 104 95 % 20 160/77 -- -- RD   01/26/25 1300 -- 120 98 % 19 131/69 -- -- RD "   01/26/25 1200 -- 112 95 % 18 131/88 -- -- RD   01/26/25 1029 98.1 °F (36.7 °C) 108 97 % 18 133/93 -- -- ADONAY            Physical Exam  Vitals and nursing note reviewed.   Constitutional:       Appearance: She is well-developed and normal weight.   HENT:      Head: Normocephalic.      Mouth/Throat:      Mouth: Mucous membranes are moist.   Eyes:      Extraocular Movements: Extraocular movements intact.      Pupils: Pupils are equal, round, and reactive to light.   Cardiovascular:      Rate and Rhythm: Regular rhythm. Tachycardia present.   Pulmonary:      Effort: Pulmonary effort is normal.      Breath sounds: Decreased breath sounds present.   Abdominal:      General: Bowel sounds are normal.      Palpations: Abdomen is soft.   Musculoskeletal:         General: Normal range of motion.      Cervical back: Normal range of motion and neck supple.      Right lower leg: No edema.      Left lower leg: No edema.   Skin:     General: Skin is warm.      Capillary Refill: Capillary refill takes less than 2 seconds.   Neurological:      General: No focal deficit present.      Mental Status: She is alert.      GCS: GCS eye subscore is 4. GCS verbal subscore is 5. GCS motor subscore is 6.         Results Reviewed       Procedure Component Value Units Date/Time    TSH, 3rd generation [[id number]]  (Normal) Collected: 01/26/25 1038    Lab Status: Final result Specimen: Blood from Arm, Right Updated: 01/26/25 1822     TSH 3RD GENERATON 1.704 uIU/mL     HS Troponin I 2hr [[id number]]  (Normal) Collected: 01/26/25 1307    Lab Status: Final result Specimen: Blood from Arm, Right Updated: 01/26/25 1337     hs TnI 2hr 3 ng/L      Delta 2hr hsTnI -1 ng/L     Protime-INR [[id number]]  (Abnormal) Collected: 01/26/25 1038    Lab Status: Final result Specimen: Blood from Arm, Right Updated: 01/26/25 1120     Protime 17.2 seconds      INR 1.35    Narrative:      INR Therapeutic Range    Indication                                             INR  Range      Atrial Fibrillation                                               2.0-3.0  Hypercoagulable State                                    2.0.2.3  Left Ventricular Asist Device                            2.0-3.0  Mechanical Heart Valve                                  -    Aortic(with afib, MI, embolism, HF, LA enlargement,    and/or coagulopathy)                                     2.0-3.0 (2.5-3.5)     Mitral                                                             2.5-3.5  Prosthetic/Bioprosthetic Heart Valve               2.0-3.0  Venous thromboembolism (VTE: VT, PE        2.0-3.0    APTT [[id number]]  (Normal) Collected: 01/26/25 1038    Lab Status: Final result Specimen: Blood from Arm, Right Updated: 01/26/25 1120     PTT 31 seconds     B-Type Natriuretic Peptide(BNP) [[id number]]  (Abnormal) Collected: 01/26/25 1038    Lab Status: Final result Specimen: Blood from Arm, Right Updated: 01/26/25 1110      pg/mL     HS Troponin 0hr (reflex protocol) [[id number]]  (Normal) Collected: 01/26/25 1038    Lab Status: Final result Specimen: Blood from Arm, Right Updated: 01/26/25 1107     hs TnI 0hr 4 ng/L     Comprehensive metabolic panel [[id number]]  (Abnormal) Collected: 01/26/25 1038    Lab Status: Final result Specimen: Blood from Arm, Right Updated: 01/26/25 1059     Sodium 140 mmol/L      Potassium 4.0 mmol/L      Chloride 106 mmol/L      CO2 26 mmol/L      ANION GAP 8 mmol/L      BUN 13 mg/dL      Creatinine 0.74 mg/dL      Glucose 113 mg/dL      Calcium 9.4 mg/dL      AST 12 U/L      ALT 14 U/L      Alkaline Phosphatase 81 U/L      Total Protein 7.0 g/dL      Albumin 3.9 g/dL      Total Bilirubin 0.40 mg/dL      eGFR 81 ml/min/1.73sq m     Narrative:      National Kid[employer] Foundation guidelines for Chronic Kidney Disease (CKD):     Stage 1 with normal or high GFR (GFR > 90 mL/min/1.73 square meters)    Stage 2 Mild CKD (GFR = 60-89 mL/min/1.73 square meters)    Stage 3A Moderate CKD (GFR =  45-59 mL/min/1.73 square meters)    Stage 3B Moderate CKD (GFR = 30-44 mL/min/1.73 square meters)    Stage 4 Severe CKD (GFR = 15-29 mL/min/1.73 square meters)    Stage 5 End Stage CKD (GFR <15 mL/min/1.73 square meters)  Note: GFR calculation is accurate only with a steady state creatinine    CBC and differential [[id number]]  (Abnormal) Collected: 01/26/25 1038    Lab Status: Final result Specimen: Blood from Arm, Right Updated: 01/26/25 1045     WBC 7.18 Thousand/uL      RBC 4.50 Million/uL      Hemoglobin 12.0 g/dL      Hematocrit 38.8 %      MCV 86 fL      MCH 26.7 pg      MCHC 30.9 g/dL      RDW 15.0 %      MPV 9.5 fL      Platelets 350 Thousands/uL      nRBC 0 /100 WBCs      Segmented % 51 %      Immature Grans % 0 %      Lymphocytes % 35 %      Monocytes % 11 %      Eosinophils Relative 2 %      Basophils Relative 1 %      Absolute Neutrophils 3.70 Thousands/µL      Absolute Immature Grans 0.02 Thousand/uL      Absolute Lymphocytes 2.49 Thousands/µL      Absolute Monocytes 0.80 Thousand/µL      Eosinophils Absolute 0.12 Thousand/µL      Basophils Absolute 0.05 Thousands/µL             CT chest without contrast   Final Interpretation by Ruth Barkley MD (01/26 1326)      Small reticulonodular opacity in the periphery of the left lower lobe. Otherwise no areas of suspicious mass or consolidation.      Partially visualized nonobstructing left renal calculus. New compared to 2017.               Workstation performed: [id number]         XR chest 2 views   Final Interpretation by Carlene Basilio MD (01/26 8551)   No acute consolidation or congestion   Mild cardiomegaly            Resident: Arturo Nash I, the attending radiologist, have reviewed the images and agree with the final report above.      Workstation performed: [id number]             ECG 12 Lead Documentation Only    Date/Time: 1/26/2025 10:36 AM    Performed by: MENDEZ Sorensen  Authorized by: MENDEZ Sorensen     Indications / Diagnosis:  Afib  Patient location:  ED  Previous ECG:     Previous ECG:  Compared to current    Comparison ECG info:  98    Similarity:  Changes noted  Rate:     ECG rate:  98  Rhythm:     Rhythm: atrial fibrillation    Ectopy:     Ectopy: none    QRS:     QRS axis:  Normal  Conduction:     Conduction: normal    ST segments:     ST segments:  Normal  T waves:     T waves: normal        ED Medication and Procedure Management   Prior to Admission Medications   Prescriptions Last Dose Informant Patient Reported? Taking?   Fluticasone-Salmeterol (Advair Diskus) 250-50 mcg/dose inhaler 2025 Self Yes Yes   Xarelto 20 MG tablet 2025 Self Yes Yes   amLODIPine (NORVASC) 5 mg tablet 2025 Self Yes Yes   Si.5 in am and 2.5 in pm   atorvastatin (LIPITOR) 10 mg tablet 2025 Self Yes Yes   hydrochlorothiazide (HYDRODIURIL) 12.5 mg tablet 2025 Self Yes Yes   Sig: Take 12.5 mg by mouth daily   metoprolol succinate (TOPROL-XL) 25 mg 24 hr tablet 2025 Self Yes Yes   Sig: TAKE 1 TABLET BY MOUTH TWICE A DAY IN ADDITION TO THE 50MG TABLET TWICE A DAY   metoprolol succinate (TOPROL-XL) 50 mg 24 hr tablet 2025 Self Yes Yes   omeprazole (PriLOSEC) 20 mg delayed release capsule 2025 Self Yes Yes   Sig: TAKE 1 CAPSULE BY MOUTH EVERY DAY 30 MINUTES BEFORE MORNING MEAL   predniSONE 20 mg tablet  Self Yes No   Sig: PLEASE SEE ATTACHED FOR DETAILED DIRECTIONS   psyllium (METAMUCIL) packet More than a month Self Yes No   Sig: Take 1 packet by mouth daily   sodium picosulfate, magnesium oxide, citric acid (Clenpiq) oral solution  Self No No   Sig: Take 175 mL (1 bottle) the evening before the colonoscopy, between 5 PM and 9 PM, followed by a second 175 mL bottle 5 hours before the colonoscopy.      Facility-Administered Medications: None     Patient's Medications   Discharge Prescriptions    No medications on file     No discharge procedures on file.  ED SEPSIS DOCUMENTATION   Time  reflects when diagnosis was documented in both MDM as applicable and the Disposition within this note       Time User Action Codes Description Comment    1/26/2025  1:41 PM Shiela Driver [I50.9] CHF (congestive heart failure) (HCC)     1/26/2025  1:41 PM Shiela Driver [R06.02] SOB (shortness of breath)     1/26/2025  1:42 PM Shiela Driver [I48.92] Atrial flutter with rapid ventricular response (HCC)     1/26/2025  1:42 PM Shiela Driver Remove [I48.92] Atrial flutter with rapid ventricular response (HCC)     1/26/2025  1:42 PM Shiela Driver [I48.91] Atrial fibrillation with rapid ventricular response (HCC)                  MENDEZ Sorensen  01/1950

## 2025-01-27 LAB
ANION GAP SERPL CALCULATED.3IONS-SCNC: 7 MMOL/L (ref 4–13)
BASOPHILS # BLD AUTO: 0.04 THOUSANDS/ΜL (ref 0–0.1)
BASOPHILS NFR BLD AUTO: 1 % (ref 0–1)
BUN SERPL-MCNC: 14 MG/DL (ref 5–25)
CALCIUM SERPL-MCNC: 9 MG/DL (ref 8.4–10.2)
CHLORIDE SERPL-SCNC: 104 MMOL/L (ref 96–108)
CO2 SERPL-SCNC: 26 MMOL/L (ref 21–32)
CREAT SERPL-MCNC: 0.63 MG/DL (ref 0.6–1.3)
EOSINOPHIL # BLD AUTO: 0.13 THOUSAND/ΜL (ref 0–0.61)
EOSINOPHIL NFR BLD AUTO: 2 % (ref 0–6)
ERYTHROCYTE [DISTWIDTH] IN BLOOD BY AUTOMATED COUNT: 15.4 % (ref 11.6–15.1)
GFR SERPL CREATININE-BSD FRML MDRD: 89 ML/MIN/1.73SQ M
GLUCOSE SERPL-MCNC: 131 MG/DL (ref 65–140)
HCT VFR BLD AUTO: 37.9 % (ref 34.8–46.1)
HGB BLD-MCNC: 11.8 G/DL (ref 11.5–15.4)
IMM GRANULOCYTES # BLD AUTO: 0.02 THOUSAND/UL (ref 0–0.2)
IMM GRANULOCYTES NFR BLD AUTO: 0 % (ref 0–2)
LYMPHOCYTES # BLD AUTO: 2.41 THOUSANDS/ΜL (ref 0.6–4.47)
LYMPHOCYTES NFR BLD AUTO: 35 % (ref 14–44)
MAGNESIUM SERPL-MCNC: 1.9 MG/DL (ref 1.9–2.7)
MCH RBC QN AUTO: 26.7 PG (ref 26.8–34.3)
MCHC RBC AUTO-ENTMCNC: 31.1 G/DL (ref 31.4–37.4)
MCV RBC AUTO: 86 FL (ref 82–98)
MONOCYTES # BLD AUTO: 0.74 THOUSAND/ΜL (ref 0.17–1.22)
MONOCYTES NFR BLD AUTO: 11 % (ref 4–12)
NEUTROPHILS # BLD AUTO: 3.51 THOUSANDS/ΜL (ref 1.85–7.62)
NEUTS SEG NFR BLD AUTO: 51 % (ref 43–75)
NRBC BLD AUTO-RTO: 0 /100 WBCS
PLATELET # BLD AUTO: 308 THOUSANDS/UL (ref 149–390)
PMV BLD AUTO: 9.4 FL (ref 8.9–12.7)
POTASSIUM SERPL-SCNC: 3.7 MMOL/L (ref 3.5–5.3)
RBC # BLD AUTO: 4.42 MILLION/UL (ref 3.81–5.12)
SODIUM SERPL-SCNC: 137 MMOL/L (ref 135–147)
WBC # BLD AUTO: 6.85 THOUSAND/UL (ref 4.31–10.16)

## 2025-01-27 PROCEDURE — 99222 1ST HOSP IP/OBS MODERATE 55: CPT | Performed by: INTERNAL MEDICINE

## 2025-01-27 PROCEDURE — 80048 BASIC METABOLIC PNL TOTAL CA: CPT | Performed by: INTERNAL MEDICINE

## 2025-01-27 PROCEDURE — 99232 SBSQ HOSP IP/OBS MODERATE 35: CPT | Performed by: FAMILY MEDICINE

## 2025-01-27 PROCEDURE — 85025 COMPLETE CBC W/AUTO DIFF WBC: CPT | Performed by: INTERNAL MEDICINE

## 2025-01-27 RX ORDER — LEVALBUTEROL INHALATION SOLUTION 0.63 MG/3ML
0.63 SOLUTION RESPIRATORY (INHALATION) EVERY 6 HOURS PRN
Status: DISCONTINUED | OUTPATIENT
Start: 2025-01-27 | End: 2025-01-28 | Stop reason: HOSPADM

## 2025-01-27 RX ORDER — METOPROLOL TARTRATE 50 MG
50 TABLET ORAL EVERY 6 HOURS
Status: DISCONTINUED | OUTPATIENT
Start: 2025-01-27 | End: 2025-01-28

## 2025-01-27 RX ADMIN — FLUTICASONE FUROATE AND VILANTEROL TRIFENATATE 1 PUFF: 200; 25 POWDER RESPIRATORY (INHALATION) at 08:45

## 2025-01-27 RX ADMIN — METOPROLOL TARTRATE 50 MG: 50 TABLET, FILM COATED ORAL at 17:51

## 2025-01-27 RX ADMIN — METOPROLOL TARTRATE 37.5 MG: 25 TABLET, FILM COATED ORAL at 05:43

## 2025-01-27 RX ADMIN — HYDROCHLOROTHIAZIDE 12.5 MG: 12.5 TABLET ORAL at 08:45

## 2025-01-27 RX ADMIN — METOPROLOL TARTRATE 37.5 MG: 25 TABLET, FILM COATED ORAL at 00:10

## 2025-01-27 RX ADMIN — METOPROLOL TARTRATE 37.5 MG: 25 TABLET, FILM COATED ORAL at 12:22

## 2025-01-27 RX ADMIN — RIVAROXABAN 20 MG: 10 TABLET, FILM COATED ORAL at 17:51

## 2025-01-27 RX ADMIN — AMLODIPINE BESYLATE 2.5 MG: 2.5 TABLET ORAL at 08:45

## 2025-01-27 RX ADMIN — METOPROLOL TARTRATE 50 MG: 50 TABLET, FILM COATED ORAL at 21:24

## 2025-01-27 RX ADMIN — AMLODIPINE BESYLATE 2.5 MG: 2.5 TABLET ORAL at 21:24

## 2025-01-27 RX ADMIN — PANTOPRAZOLE SODIUM 20 MG: 20 TABLET, DELAYED RELEASE ORAL at 05:44

## 2025-01-27 RX ADMIN — ATORVASTATIN CALCIUM 10 MG: 10 TABLET, FILM COATED ORAL at 17:51

## 2025-01-27 NOTE — CONSULTS
Consultation - Cardiology   Name: Kat Elise 74 y.o. female I MRN: [id number]  Unit/Bed#: -01 I Date of Admission: 1/26/2025   Date of Service: 1/27/2025 I Hospital Day: 1   Consult to cardiology  Consult performed by: MENDEZ Kim  Consult ordered by: MENDEZ Sorensen        Physician Requesting Evaluation: Jessica Roca MD   Reason for Evaluation / Principal Problem: afib with RVR     Assessment & Plan  Rapid atrial fibrillation (HCC)  Patient with a history of rapid A-fib follows with [facility] cardiology and had a cardioversion in September 2024.  Patient states she previously was able to feel palpitations, but her primary symptom was fatigue with atrial fibrillation.      [id number]: 3  Anticoagulation: Xarelto 20 mg daily, has been compliant with all doses  Rate control: At home regimen metoprolol succinate 75 mg twice daily, switched to metoprolol to tartrate 37.5 mg every 6 hours for better rate control.  Rhythm control: none   Hypertension  Patient maintained on amlodipine 2.5 mg twice daily, hydrochlorothiazide 12.5 mg daily, metoprolol succinate 75 mg daily  PILAR (obstructive sleep apnea)  Wears CPAP at home, follows with [facility]'s sleep lab.    Recommendations  Follow-up on echocardiogram  Consider Amiodarone     History of Present Illness   Kat Elise is a 74 y.o. female with a past medical history of atrial fibrillation, hypertension, hyperlipidemia who presents with fatigue and increased heart rate.    Patient states that she had influenza at the beginning of January and since then has not felt well.  She noticed that her blood pressure cuff had been indicating that her heart rate was fast for the last week, and she continued to feel fatigued prompting her to come to the emergency room for evaluation.    Upon arrival patient noted to be in A-fib with heart rate in the 120s.  She previously had a history of atrial fibrillation and underwent a  cardioversion in September 2024.  She was given Cardizem in the emergency department with some improvement of her heart rates.    Review of Systems   Constitutional:  Positive for fatigue. Negative for unexpected weight change.   Respiratory:  Positive for shortness of breath.    Cardiovascular:  Negative for chest pain, palpitations and leg swelling.   Gastrointestinal:  Positive for anal bleeding and blood in stool. Negative for constipation, diarrhea, nausea, rectal pain and vomiting.   Genitourinary:  Negative for hematuria.   Neurological:  Positive for weakness. Negative for dizziness, syncope and light-headedness.     I have reviewed the patient's PMH, PSH, Social History, Family History, Meds, and Allergies    Objective :  Temp:  [98.1 °F (36.7 °C)-98.4 °F (36.9 °C)] 98.4 °F (36.9 °C)  HR:  [] 93  BP: (124-160)/(69-93) 141/88  Resp:  [16-23] 20  SpO2:  [93 %-98 %] 94 %  O2 Device: None (Room air)  Orthostatic Blood Pressures      Flowsheet Row Most Recent Value   Blood Pressure 141/88 filed at 01/27/2025 0825   Patient Position - Orthostatic VS Lying filed at 01/27/2025 0537          First Weight: Weight - Scale: 97.2 kg (214 lb 3.2 oz) (01/26/25 2035)  Vitals:    01/26/25 2035 01/27/25 0541   Weight: 97.2 kg (214 lb 3.2 oz) 96.8 kg (213 lb 8 oz)       Physical Exam  Constitutional:       Appearance: Normal appearance.   HENT:      Head: Normocephalic.      Mouth/Throat:      Mouth: Mucous membranes are dry.   Cardiovascular:      Rate and Rhythm: Tachycardia present. Rhythm irregular.      Pulses: Normal pulses.      Heart sounds: Normal heart sounds.   Pulmonary:      Effort: Pulmonary effort is normal.      Breath sounds: Normal breath sounds.   Abdominal:      General: Bowel sounds are normal.      Palpations: Abdomen is soft.   Musculoskeletal:      Right lower leg: No edema.      Left lower leg: No edema.   Skin:     General: Skin is warm.      Capillary Refill: Capillary refill takes less than 2  "seconds.   Neurological:      General: No focal deficit present.      Mental Status: She is alert and oriented to person, place, and time. Mental status is at baseline.         Lab Results: I have reviewed the following results:  Results from last 7 days   Lab Units 01/27/25  0541 01/26/25  1038   WBC Thousand/uL 6.85 7.18   HEMOGLOBIN g/dL 11.8 12.0   HEMATOCRIT % 37.9 38.8   PLATELETS Thousands/uL 308 350     Results from last 7 days   Lab Units 01/27/25  0541 01/26/25  1038   POTASSIUM mmol/L 3.7 4.0   CHLORIDE mmol/L 104 106   CO2 mmol/L 26 26   BUN mg/dL 14 13   CREATININE mg/dL 0.63 0.74   CALCIUM mg/dL 9.0 9.4     Results from last 7 days   Lab Units 01/26/25  1038   INR  1.35*   PTT seconds 31     No results found for: \"HGBA1C\"  No results found for: \"CKTOTAL\", \"CKMB\", \"CKMBINDEX\", \"TROPONINI\"    Imaging Results Review: I reviewed radiology reports from this admission including: chest xray and CT chest.  Other Study Results Review: EKG was reviewed.     VTE Prophylaxis: VTE covered by:  rivaroxaban, Oral, 20 mg at 01/26/25 1638       "

## 2025-01-27 NOTE — ASSESSMENT & PLAN NOTE
Patient maintained on amlodipine 2.5 mg twice daily, hydrochlorothiazide 12.5 mg daily, metoprolol succinate 75 mg daily

## 2025-01-27 NOTE — ASSESSMENT & PLAN NOTE
"Worsening SOB and ROMERO since flu infection  Breathing comfortably on RA, no O2 requirement  Unclear if this is truly new HF vs less likely asthmas exacerbation given wheeze heard on exam  Has not recovered from a respiratory standpoint since flu infection approx a week ago,   No LE edema, no orthopnea, no JVD, no crackles heard  BNP elevated at 424, no prior BNPs found for comparison  CXR with no acute consolidation or congestion, mild cardiomegaly  CT chest : Small reticulonodular opacity in the periphery of the left lower lobe. Otherwise no areas of suspicious mass or consolidation  Pt reports recent echo was \"normal\", cannot verify this as difficulty accessing [employer] records  S/p IV Lasix 40 mg x1 in the ED, reports good UOP but is in ED bedhold with no urine charted    Obtain echo; still pending  Cardiology consulted, appreciate their assistance  Monitor UOP closely, daily standing weights   Pt is lasix naive, will monitor response and diurese as needed for now  Trial nebs, respiratory protocol   "

## 2025-01-27 NOTE — ASSESSMENT & PLAN NOTE
Presents with SOB and alerts for irregular HR on her BP cuff at home  HR averaging 120s on arrival, noted to be in rapid Afib  Likely precipitated by Flu infx  Pt reports successful cardioversion at  in September 2024, has been in sinus as far as she knows until this week  Compliant with Metop XL 75 mg BID and Xarelto 20 mg daily   S/p IV Cardizem 10 mg push in the ED with improvement  Cardiology plans cardioversion tomorrow or Amiodarone.  Monitor on tele  Started Metoprolol 50 mg every 6 hours

## 2025-01-27 NOTE — PLAN OF CARE

## 2025-01-27 NOTE — ASSESSMENT & PLAN NOTE
Patient with a history of rapid A-fib follows with [facility] cardiology and had a cardioversion in September 2024.  Patient states she previously was able to feel palpitations, but her primary symptom was fatigue with atrial fibrillation.      [id number]: 3  Anticoagulation: Xarelto 20 mg daily, has been compliant with all doses  Rate control: At home regimen metoprolol succinate 75 mg twice daily, switched to metoprolol to tartrate 37.5 mg every 6 hours for better rate control.  Rhythm control: none

## 2025-01-27 NOTE — CASE MANAGEMENT
Case Management Assessment & Discharge Planning Note    Patient name Kat Elise  Location /-01 MRN [id number]  : 1950 Date 2025       Current Admission Date: 2025  Current Admission Diagnosis:Rapid atrial fibrillation (HCC)   Patient Active Problem List    Diagnosis Date Noted Date Diagnosed    Rapid atrial fibrillation (HCC) 2025     Dyspnea 2025     Hypertension 2025     Hyperlipidemia 2025     Primary osteoarthritis of left knee 2024     Pes anserinus bursitis of left knee 2024     Moderate persistent asthma without complication 2024     PILAR (obstructive sleep apnea) 2024     Adjustment insomnia 2024     Snoring 2024     Excessive daytime sleepiness 2024     Obesity, morbid (HCC) 2023       LOS (days): 1  Geometric Mean LOS (GMLOS) (days): 2.3  Days to GMLOS:1.2     OBJECTIVE:    Risk of Unplanned Readmission Score: 10         Current admission status: Inpatient       [facility]erred Pharmacy:   [email or web address] [address] [place] [address]  Phone: [phone] Fax: [phone]    Primary Care Provider: Reece Sanabria MD    Primary Insurance: MEDICARE  Secondary Insurance: [employer]    ASSESSMENT:  Active Health Care Proxies       rTey Elise Health Care Representative - Spouse   Primary Phone: [phone] (Home)  Mobile Phone: [phone]                 Advance Directives  Does patient have a Health Care POA?: Yes  Does patient have Advance Directives?: Yes  Advance Directives: Power of  for health care  Primary Contact: Trey Elise, spouse.         Readmission Root Cause  30 Day Readmission: No    Patient Information  Admitted from:: Home  Mental Status: Alert  During Assessment patient was accompanied by: Not accompanied during assessment  Assessment information provided by:: Patient  Primary Caregiver:  Self  Support Systems: Spouse/significant other, Self  [facility] Residence: [place]  What city do you live in?: [place]  Home entry access options. Select all that apply.: Stairs  Number of steps to enter home.: 1  Type of Current Residence: [facility]  Living Arrangements: Lives w/ Spouse/significant other  Is patient a ?: No    Activities of Daily Living Prior to Admission  Functional Status: Independent  Completes ADLs independently?: Yes  Ambulates independently?: Yes  Does patient use assisted devices?: Yes  Assisted Devices (DME) used: CPAP  DME Company Name (respiratory supplies): [employer].  Does patient currently own DME?: Yes  What DME does the patient currently own?: CPAP, Straight Cane  Does patient have a history of Outpatient Therapy (PT/OT)?: Yes  Does the patient have a history of Short-Term Rehab?: No  Does patient have a history of HHC?: No  Does patient currently have HHC?: No         Patient Information Continued  Income Source: SSI/SSD  Does patient have prescription coverage?: Yes  Does patient receive dialysis treatments?: No  Does patient have a history of substance abuse?: No  Does patient have a history of Mental Health Diagnosis?: No         Means of Transportation  Means of Transport to Appts:: Drives Self          DISCHARGE DETAILS:    Discharge planning discussed with:: Pt  Freedom of Choice: Yes     CM contacted family/caregiver?: No- see comments (Pt is in contact with family.)  Were Treatment Team discharge recommendations reviewed with patient/caregiver?: Yes  Did patient/caregiver verbalize understanding of patient care needs?: Yes  Were patient/caregiver advised of the risks associated with not following Treatment Team discharge recommendations?: Yes         Requested Home Health Care         Is the patient interested in HHC at discharge?: No    DME Referral Provided  Referral made for DME?: No    Other Referral/Resources/Interventions Provided:  Interventions: None  Indicated         Treatment Team Recommendation: Home  Discharge Destination Plan:: Home                                         Additional Comments: CM met with pt at bedside to discern discharge needs. Pt lives with spouse in a 1sh, 1STE, 1st fl setup. Reports being independent with ADLs and walking PTA. Uses a CPAP through [employer]. Has a cane, but does not use. Hx of OPPT. No hx of STR, HHC, inpatient psych, D&A. Medical POA is spouse. Spouse will be transport. [facility] in [place] is preferred.

## 2025-01-27 NOTE — ASSESSMENT & PLAN NOTE
Continue Breo IH daily as substitute for once daily Advair  No wheeze on exam  Continue nebs, hold off on steroids due to rapid Afib

## 2025-01-27 NOTE — PROGRESS NOTES
"Progress Note - Hospitalist   Name: Kat Elise 74 y.o. female I MRN: [id number]  Unit/Bed#: -01 I Date of Admission: 1/26/2025   Date of Service: 1/27/2025 I Hospital Day: 1    Assessment & Plan  Rapid atrial fibrillation (HCC)  Presents with SOB and alerts for irregular HR on her BP cuff at home  HR averaging 120s on arrival, noted to be in rapid Afib  Likely precipitated by Flu infx  Pt reports successful cardioversion at  in September 2024, has been in sinus as far as she knows until this week  Compliant with Metop XL 75 mg BID and Xarelto 20 mg daily   S/p IV Cardizem 10 mg push in the ED with improvement  Cardiology plans cardioversion tomorrow or Amiodarone.  Monitor on tele  Started Metoprolol 50 mg every 6 hours   Obesity, morbid (HCC)    PILAR (obstructive sleep apnea)  Continue CPAP hs  Moderate persistent asthma without complication  Continue Breo IH daily as substitute for once daily Advair  No wheeze on exam  Continue nebs, hold off on steroids due to rapid Afib  Dyspnea  Worsening SOB and ROMERO since flu infection  Breathing comfortably on RA, no O2 requirement  Unclear if this is truly new HF vs less likely asthmas exacerbation given wheeze heard on exam  Has not recovered from a respiratory standpoint since flu infection approx a week ago,   No LE edema, no orthopnea, no JVD, no crackles heard  BNP elevated at 424, no prior BNPs found for comparison  CXR with no acute consolidation or congestion, mild cardiomegaly  CT chest : Small reticulonodular opacity in the periphery of the left lower lobe. Otherwise no areas of suspicious mass or consolidation  Pt reports recent echo was \"normal\", cannot verify this as difficulty accessing [facility] records  S/p IV Lasix 40 mg x1 in the ED, reports good UOP but is in ED bedhold with no urine charted    Obtain echo; still pending  Cardiology consulted, appreciate their assistance  Monitor UOP closely, daily standing weights   Pt is lasix naive, " will monitor response and diurese as needed for now  Trial nebs, respiratory protocol   Hypertension  BP's acceptable thusfar  Fractionating metoprolol for HR control, continue HCTZ 12.5 mg daily and amlodipine 2.5 mg BID  Monitor BP as also received IV Lasix x1  Hyperlipidemia  Continue home statin    VTE Pharmacologic Prophylaxis: VTE Score: 4 High Risk (Score >/= 5) - Pharmacological DVT Prophylaxis Ordered: rivaroxaban (Xarelto). Sequential Compression Devices Ordered.    Mobility:   Basic Mobility Inpatient Raw Score: 24  JH-HLM Goal: 8: Walk 250 feet or more  JH-HLM Achieved: 8: Walk 250 feet ot more  JH-HLM Goal achieved. Continue to encourage appropriate mobility.    Patient Centered Rounds: I performed bedside rounds with nursing staff today.   Discussions with Specialists or Other Care Team Provider: nursing    Education and Discussions with Family / Patient: Patient declined call to .     Current Length of Stay: 1 day(s)  Current Patient Status: Inpatient   Certification Statement: The patient will continue to require additional inpatient hospital stay due to a. fib  Discharge Plan: Anticipate discharge tomorrow to home.    Code Status: Level 1 - Full Code    Subjective   Kat is doing well today. She denies nay chest pressure or palpitations.     Objective :  Temp:  [98.1 °F (36.7 °C)-98.4 °F (36.9 °C)] 98.4 °F (36.9 °C)  HR:  [] 70  BP: (115-152)/(72-92) 115/74  Resp:  [16-23] 18  SpO2:  [93 %-97 %] 95 %  O2 Device: None (Room air)    Body mass index is 41.7 kg/m².     Input and Output Summary (last 24 hours):     Intake/Output Summary (Last 24 hours) at 1/27/2025 1508  Last data filed at 1/27/2025 1354  Gross per 24 hour   Intake 240 ml   Output 595 ml   Net -355 ml       Physical Exam  Vitals and nursing note reviewed.   Constitutional:       General: She is not in acute distress.     Appearance: She is well-developed.   HENT:      Head: Normocephalic and atraumatic.   Eyes:       Conjunctiva/sclera: Conjunctivae normal.   Cardiovascular:      Rate and Rhythm: Normal rate. Rhythm irregular.      Heart sounds: No murmur heard.  Pulmonary:      Effort: Pulmonary effort is normal. No respiratory distress.      Breath sounds: Normal breath sounds.   Abdominal:      Palpations: Abdomen is soft.      Tenderness: There is no abdominal tenderness.   Musculoskeletal:         General: No swelling.      Cervical back: Neck supple.   Skin:     General: Skin is warm and dry.      Capillary Refill: Capillary refill takes less than 2 seconds.   Neurological:      Mental Status: She is alert.   Psychiatric:         Mood and Affect: Mood normal.           Lines/Drains:        Telemetry:  Telemetry Orders (From admission, onward)               24 Hour Telemetry Monitoring  Continuous x 24 Hours (Telem)        Expiring   Question:  Reason for 24 Hour Telemetry  Answer:  Arrhythmias requiring acute medical intervention / PPM or ICD malfunction                     Telemetry Reviewed: Atrial fibrillation. HR averaging 90  Indication for Continued Telemetry Use: Arrthymias requiring medical therapy               Lab Results: I have reviewed the following results:   Results from last 7 days   Lab Units 01/27/25  0541   WBC Thousand/uL 6.85   HEMOGLOBIN g/dL 11.8   HEMATOCRIT % 37.9   PLATELETS Thousands/uL 308   SEGS PCT % 51   LYMPHO PCT % 35   MONO PCT % 11   EOS PCT % 2     Results from last 7 days   Lab Units 01/27/25  0541 01/26/25  1038   SODIUM mmol/L 137 140   POTASSIUM mmol/L 3.7 4.0   CHLORIDE mmol/L 104 106   CO2 mmol/L 26 26   BUN mg/dL 14 13   CREATININE mg/dL 0.63 0.74   ANION GAP mmol/L 7 8   CALCIUM mg/dL 9.0 9.4   ALBUMIN g/dL  --  3.9   TOTAL BILIRUBIN mg/dL  --  0.40   ALK PHOS U/L  --  81   ALT U/L  --  14   AST U/L  --  12*   GLUCOSE RANDOM mg/dL 131 113     Results from last 7 days   Lab Units 01/26/25  1038   INR  1.35*                   Recent Cultures (last 7 days):               Last 24  Hours Medication List:     Current Facility-Administered Medications:     amLODIPine (NORVASC) tablet 2.5 mg, BID    atorvastatin (LIPITOR) tablet 10 mg, Daily With Dinner    fluticasone-vilanterol 200-25 mcg/actuation 1 puff, Daily    levalbuterol (XOPENEX) inhalation solution 0.31 mg, Q6H PRN    metoprolol tartrate (LOPRESSOR) tablet 50 mg, Q6H    pantoprazole (PROTONIX) EC tablet 20 mg, Early Morning    rivaroxaban (XARELTO) tablet 20 mg, Daily With Dinner    Administrative Statements   Today, Patient Was Seen By: Jessica Roca MD      **Please Note: This note may have been constructed using a voice recognition system.**

## 2025-01-28 ENCOUNTER — ANESTHESIA EVENT (INPATIENT)
Dept: NON INVASIVE DIAGNOSTICS | Facility: HOSPITAL | Age: 75
DRG: 309 | End: 2025-01-28
Payer: MEDICARE

## 2025-01-28 ENCOUNTER — ANESTHESIA EVENT (OUTPATIENT)
Dept: ANESTHESIOLOGY | Facility: HOSPITAL | Age: 75
End: 2025-01-28

## 2025-01-28 ENCOUNTER — ANESTHESIA (OUTPATIENT)
Dept: ANESTHESIOLOGY | Facility: HOSPITAL | Age: 75
End: 2025-01-28

## 2025-01-28 VITALS
RESPIRATION RATE: 18 BRPM | TEMPERATURE: 97.9 F | BODY MASS INDEX: 41.85 KG/M2 | OXYGEN SATURATION: 93 % | SYSTOLIC BLOOD PRESSURE: 124 MMHG | HEART RATE: 70 BPM | HEIGHT: 60 IN | WEIGHT: 213.19 LBS | DIASTOLIC BLOOD PRESSURE: 73 MMHG

## 2025-01-28 PROBLEM — K21.9 GASTROESOPHAGEAL REFLUX DISEASE: Status: ACTIVE | Noted: 2025-01-28

## 2025-01-28 LAB
AORTIC ROOT: 3.5 CM
AORTIC VALVE MEAN VELOCITY: 7 M/S
ASCENDING AORTA: 3.6 CM
AV LVOT MEAN GRADIENT: 1 MMHG
AV LVOT PEAK GRADIENT: 3 MMHG
AV MEAN PRESS GRAD SYS DOP V1V2: 2 MMHG
AV PEAK GRADIENT: 5 MMHG
AV VELOCITY RATIO: 0.75
BSA FOR ECHO PROCEDURE: 1.92 M2
DOP CALC AO VTI: 19.2 CM
DOP CALC LVOT PEAK VEL VTI: 14.4 CM
DOP CALC LVOT PEAK VEL: 0.89 M/S
E WAVE DECELERATION TIME: 127 MS
FRACTIONAL SHORTENING: 26 (ref 28–44)
INTERVENTRICULAR SEPTUM IN DIASTOLE (PARASTERNAL SHORT AXIS VIEW): 1 CM
INTERVENTRICULAR SEPTUM: 1 CM (ref 0.6–1.1)
LA/AORTA RATIO 2D: 1.23
LAAS-AP2: 25.7 CM2
LAAS-AP4: 33.9 CM2
LEFT ATRIUM SIZE: 4.3 CM
LEFT ATRIUM VOLUME (MOD BIPLANE): 109 ML
LEFT ATRIUM VOLUME INDEX (MOD BIPLANE): 56.8 ML/M2
LEFT INTERNAL DIMENSION IN SYSTOLE: 3.1 CM (ref 2.1–4)
LEFT VENTRICLE DIASTOLIC VOLUME (MOD BIPLANE): 64 ML
LEFT VENTRICLE DIASTOLIC VOLUME INDEX (MOD BIPLANE): 33.3 ML/M2
LEFT VENTRICLE SYSTOLIC VOLUME (MOD BIPLANE): 26 ML
LEFT VENTRICLE SYSTOLIC VOLUME INDEX (MOD BIPLANE): 13.5 ML/M2
LEFT VENTRICULAR INTERNAL DIMENSION IN DIASTOLE: 4.2 CM (ref 3.5–6)
LEFT VENTRICULAR POSTERIOR WALL IN END DIASTOLE: 1.2 CM
LEFT VENTRICULAR STROKE VOLUME: 40 ML
LV EF BIPLANE MOD: 59 %
LV EF US.2D.A4C+ESTIMATED: 54 %
LVSV (TEICH): 40 ML
MV E'TISSUE VEL-LAT: 13 CM/S
MV E'TISSUE VEL-SEP: 13 CM/S
MV PEAK E VEL: 72 CM/S
QRS AXIS: 4 DEGREES
QRSD INTERVAL: 74 MS
QT INTERVAL: 328 MS
QTC INTERVAL: 419 MS
RIGHT VENTRICLE ID DIMENSION: 3.9 CM
SL CV LV EF: 55
SL CV PED ECHO LEFT VENTRICLE DIASTOLIC VOLUME (MOD BIPLANE) 2D: 79 ML
SL CV PED ECHO LEFT VENTRICLE SYSTOLIC VOLUME (MOD BIPLANE) 2D: 38 ML
T WAVE AXIS: 20 DEGREES
TRICUSPID ANNULAR PLANE SYSTOLIC EXCURSION: 2.2 CM
VENTRICULAR RATE: 98 BPM

## 2025-01-28 PROCEDURE — 5A2204Z RESTORATION OF CARDIAC RHYTHM, SINGLE: ICD-10-PCS | Performed by: INTERNAL MEDICINE

## 2025-01-28 PROCEDURE — 99239 HOSP IP/OBS DSCHRG MGMT >30: CPT

## 2025-01-28 PROCEDURE — 93306 TTE W/DOPPLER COMPLETE: CPT

## 2025-01-28 PROCEDURE — 93005 ELECTROCARDIOGRAM TRACING: CPT

## 2025-01-28 PROCEDURE — 92960 CARDIOVERSION ELECTRIC EXT: CPT | Performed by: INTERNAL MEDICINE

## 2025-01-28 PROCEDURE — 93010 ELECTROCARDIOGRAM REPORT: CPT | Performed by: INTERNAL MEDICINE

## 2025-01-28 PROCEDURE — 92960 CARDIOVERSION ELECTRIC EXT: CPT

## 2025-01-28 PROCEDURE — 93306 TTE W/DOPPLER COMPLETE: CPT | Performed by: INTERNAL MEDICINE

## 2025-01-28 PROCEDURE — 99232 SBSQ HOSP IP/OBS MODERATE 35: CPT | Performed by: INTERNAL MEDICINE

## 2025-01-28 RX ORDER — METOPROLOL TARTRATE 50 MG
100 TABLET ORAL EVERY 12 HOURS SCHEDULED
Status: DISCONTINUED | OUTPATIENT
Start: 2025-01-28 | End: 2025-01-28 | Stop reason: HOSPADM

## 2025-01-28 RX ORDER — FLECAINIDE ACETATE 100 MG/1
100 TABLET ORAL EVERY 12 HOURS SCHEDULED
Status: DISCONTINUED | OUTPATIENT
Start: 2025-01-28 | End: 2025-01-28 | Stop reason: HOSPADM

## 2025-01-28 RX ORDER — SODIUM CHLORIDE, SODIUM LACTATE, POTASSIUM CHLORIDE, CALCIUM CHLORIDE 600; 310; 30; 20 MG/100ML; MG/100ML; MG/100ML; MG/100ML
INJECTION, SOLUTION INTRAVENOUS CONTINUOUS PRN
Status: DISCONTINUED | OUTPATIENT
Start: 2025-01-28 | End: 2025-01-28

## 2025-01-28 RX ORDER — FLECAINIDE ACETATE 100 MG/1
100 TABLET ORAL EVERY 12 HOURS SCHEDULED
Qty: 60 TABLET | Refills: 0 | Status: SHIPPED | OUTPATIENT
Start: 2025-01-28

## 2025-01-28 RX ORDER — PROPOFOL 10 MG/ML
INJECTION, EMULSION INTRAVENOUS AS NEEDED
Status: DISCONTINUED | OUTPATIENT
Start: 2025-01-28 | End: 2025-01-28

## 2025-01-28 RX ORDER — METOPROLOL TARTRATE 100 MG/1
100 TABLET ORAL EVERY 12 HOURS SCHEDULED
Qty: 60 TABLET | Refills: 0 | Status: SHIPPED | OUTPATIENT
Start: 2025-01-28

## 2025-01-28 RX ADMIN — RIVAROXABAN 20 MG: 10 TABLET, FILM COATED ORAL at 16:13

## 2025-01-28 RX ADMIN — ATORVASTATIN CALCIUM 10 MG: 10 TABLET, FILM COATED ORAL at 16:13

## 2025-01-28 RX ADMIN — FLUTICASONE FUROATE AND VILANTEROL TRIFENATATE 1 PUFF: 200; 25 POWDER RESPIRATORY (INHALATION) at 07:32

## 2025-01-28 RX ADMIN — FLECAINIDE ACETATE 100 MG: 100 TABLET ORAL at 16:13

## 2025-01-28 RX ADMIN — PROPOFOL 50 MG: 10 INJECTION, EMULSION INTRAVENOUS at 13:24

## 2025-01-28 RX ADMIN — SODIUM CHLORIDE, SODIUM LACTATE, POTASSIUM CHLORIDE, AND CALCIUM CHLORIDE: .6; .31; .03; .02 INJECTION, SOLUTION INTRAVENOUS at 13:23

## 2025-01-28 RX ADMIN — AMLODIPINE BESYLATE 2.5 MG: 2.5 TABLET ORAL at 07:32

## 2025-01-28 RX ADMIN — METOPROLOL TARTRATE 50 MG: 50 TABLET, FILM COATED ORAL at 11:44

## 2025-01-28 RX ADMIN — PANTOPRAZOLE SODIUM 20 MG: 20 TABLET, DELAYED RELEASE ORAL at 05:59

## 2025-01-28 RX ADMIN — METOPROLOL TARTRATE 50 MG: 50 TABLET, FILM COATED ORAL at 05:59

## 2025-01-28 NOTE — ANESTHESIA POSTPROCEDURE EVALUATION
Post-Op Assessment Note    CV Status:  Stable  Pain Score: 0    Pain management: adequate       Mental Status:  Alert and awake   Hydration Status:  Euvolemic and stable   PONV Controlled:  Controlled   Airway Patency:  Patent and adequate     Post Op Vitals Reviewed: Yes    No anethesia notable event occurred.    Staff: CRNA           Last Filed PACU Vitals:  Vitals Value Taken Time   Temp     Pulse     BP     Resp     SpO2

## 2025-01-28 NOTE — ASSESSMENT & PLAN NOTE
Worsening SOB and ROMERO since flu infection  Breathing comfortably on RA, no O2 requirement  No wheezing noted on exam, appears euvolemic  CXR with no acute consolidation or congestion, mild cardiomegaly  CT chest : Small reticulonodular opacity in the periphery of the left lower lobe. Otherwise no areas of suspicious mass or consolidation  Echocardiogram updated: EF 50 to 55%, abnormal diastolic inflow due to atrial fibrillation  Resolved, suspect shortness of breath secondary to A-fib RVR  No further shortness of breath, cleared for discharge  Stable on room air  Recommend repeat CT chest in 3 to 6 months to ensure resolution of finding as above

## 2025-01-28 NOTE — PLAN OF CARE
Problem: Potential for Falls  Goal: Patient will remain free of falls  Description: INTERVENTIONS:  - Educate patient/family on patient safety including physical limitations  - Instruct patient to call for assistance with activity   - Consult OT/PT to assist with strengthening/mobility   - Keep Call bell within reach  - Keep bed low and locked with side rails adjusted as appropriate  - Keep care items and personal belongings within reach  - Initiate and maintain comfort rounds  - Make Fall Risk Sign visible to staff  - Offer Toileting every 2 Hours, in advance of need  - Initiate/Maintain 2 alarms  - Obtain necessary fall risk management equipment: bed in lowest position, call bell within reach.  - Apply yellow socks and bracelet for high fall risk patients  - Consider moving patient to room near nurses station  Outcome: Progressing     Problem: PAIN - ADULT  Goal: Verbalizes/displays adequate comfort level or baseline comfort level  Description: Interventions:  - Encourage patient to monitor pain and request assistance  - Assess pain using appropriate pain scale  - Administer analgesics based on type and severity of pain and evaluate response  - Implement non-pharmacological measures as appropriate and evaluate response  - Consider cultural and social influences on pain and pain management  - Notify physician/advanced practitioner if interventions unsuccessful or patient reports new pain  Outcome: Progressing     Problem: INFECTION - ADULT  Goal: Absence or prevention of progression during hospitalization  Description: INTERVENTIONS:  - Assess and monitor for signs and symptoms of infection  - Monitor lab/diagnostic results  - Monitor all insertion sites, i.e. indwelling lines, tubes, and drains  - Monitor endotracheal if appropriate and nasal secretions for changes in amount and color  - Ullin appropriate cooling/warming therapies per order  - Administer medications as ordered  - Instruct and encourage  patient and family to use good hand hygiene technique  - Identify and instruct in appropriate isolation precautions for identified infection/condition  Outcome: Progressing  Goal: Absence of fever/infection during neutropenic period  Description: INTERVENTIONS:  - Monitor WBC    Outcome: Progressing     Problem: SAFETY ADULT  Goal: Patient will remain free of falls  Description: INTERVENTIONS:  - Educate patient/family on patient safety including physical limitations  - Instruct patient to call for assistance with activity   - Consult OT/PT to assist with strengthening/mobility   - Keep Call bell within reach  - Keep bed low and locked with side rails adjusted as appropriate  - Keep care items and personal belongings within reach  - Initiate and maintain comfort rounds  - Make Fall Risk Sign visible to staff  - Offer Toileting every 2 Hours, in advance of need  - Initiate/Maintain 2 alarms  - Obtain necessary fall risk management equipment: bed in lowest position, call bell within reach.  - Apply yellow socks and bracelet for high fall risk patients  - Consider moving patient to room near nurses station  Outcome: Progressing  Goal: Maintain or return to baseline ADL function  Description: INTERVENTIONS:  -  Assess patient's ability to carry out ADLs; assess patient's baseline for ADL function and identify physical deficits which impact ability to perform ADLs (bathing, care of mouth/teeth, toileting, grooming, dressing, etc.)  - Assess/evaluate cause of self-care deficits   - Assess range of motion  - Assess patient's mobility; develop plan if impaired  - Assess patient's need for assistive devices and provide as appropriate  - Encourage maximum independence but intervene and supervise when necessary  - Involve family in performance of ADLs  - Assess for home care needs following discharge   - Consider OT consult to assist with ADL evaluation and planning for discharge  - Provide patient education as  appropriate  Outcome: Progressing  Goal: Maintains/Returns to pre admission functional level  Description: INTERVENTIONS:  - Perform AM-PAC 6 Click Basic Mobility/ Daily Activity assessment daily.  - Set and communicate daily mobility goal to care team and patient/family/caregiver.   - Collaborate with rehabilitation services on mobility goals if consulted  - Perform Range of Motion 2 times a day.  - Reposition patient every 2 hours.  - Dangle patient 2 times a day  - Stand patient 2 times a day  - Ambulate patient 2 times a day  - Out of bed to chair 2 times a day   - Out of bed for meals 2 times a day  - Out of bed for toileting  - Record patient progress and toleration of activity level   Outcome: Progressing     Problem: DISCHARGE PLANNING  Goal: Discharge to home or other facility with appropriate resources  Description: INTERVENTIONS:  - Identify barriers to discharge w/patient and caregiver  - Arrange for needed discharge resources and transportation as appropriate  - Identify discharge learning needs (meds, wound care, etc.)  - Arrange for interpretive services to assist at discharge as needed  - Refer to Case Management Department for coordinating discharge planning if the patient needs post-hospital services based on physician/advanced practitioner order or complex needs related to functional status, cognitive ability, or social support system  Outcome: Progressing     Problem: Knowledge Deficit  Goal: Patient/family/caregiver demonstrates understanding of disease process, treatment plan, medications, and discharge instructions  Description: Complete learning assessment and assess knowledge base.  Interventions:  - Provide teaching at level of understanding  - Provide teaching via preferred learning methods  Outcome: Progressing     Problem: CARDIOVASCULAR - ADULT  Goal: Maintains optimal cardiac output and hemodynamic stability  Description: INTERVENTIONS:  - Monitor I/O, vital signs and rhythm  - Monitor  for S/S and trends of decreased cardiac output  - Administer and titrate ordered vasoactive medications to optimize hemodynamic stability  - Assess quality of pulses, skin color and temperature  - Assess for signs of decreased coronary artery perfusion  - Instruct patient to report change in severity of symptoms  Outcome: Progressing  Goal: Absence of cardiac dysrhythmias or at baseline rhythm  Description: INTERVENTIONS:  - Continuous cardiac monitoring, vital signs, obtain 12 lead EKG if ordered  - Administer antiarrhythmic and heart rate control medications as ordered  - Monitor electrolytes and administer replacement therapy as ordered  Outcome: Progressing

## 2025-01-28 NOTE — PROGRESS NOTES
Progress Note - Cardiology   Name: Kat Elise 74 y.o. female I MRN: [id number]  Unit/Bed#: -01 I Date of Admission: 1/26/2025   Date of Service: 1/28/2025 I Hospital Day: 2     Assessment & Plan  Rapid atrial fibrillation (HCC)  Patient with a history of rapid A-fib follows with [facility] cardiology and had a cardioversion in September 2024.  Patient states she previously was able to feel palpitations, but her primary symptom was fatigue with atrial fibrillation.      [id number]: 3  Anticoagulation: Xarelto 20 mg daily, has been compliant with all doses  Rate control: Today we uptitrated metoprolol to 50 mg every 6 hours.  We then converted her back to sinus rhythm.  She does have normal LV systolic function but biatrial enlargement.  I am going to add flecainide 100 mg twice daily.  Change metoprolol to 100 mg twice daily.  He is now asymptomatic and feeling well and can be discharged later today.  She follows with [facility] cardiology in which she will make a follow-up appointment.  Hypertension  She will be discharged on metoprolol 100 mg twice daily and amlodipine 2.5 mg twice daily.  Discontinue HCTZ.  PILAR (obstructive sleep apnea)  Wears CPAP at home, follows with [facility]'s sleep lab.      Subjective:  Patient seen at the time of her cardioversion.  Atrial fibrillation persisted overnight.  She did convert back to sinus rhythm after 1 attempt.    Objective:     Vitals: Blood pressure 124/73, pulse 70, temperature 97.9 °F (36.6 °C), resp. rate 18, height 5' (1.524 m), weight 96.7 kg (213 lb 3 oz), SpO2 93%., Body mass index is 41.63 kg/m².,   Orthostatic Blood Pressures      Flowsheet Row Most Recent Value   Blood Pressure 124/73 filed at 01/28/2025 1440   Patient Position - Orthostatic VS Lying filed at 01/28/2025 1349              Intake/Output Summary (Last 24 hours) at 1/28/2025 1607  Last data filed at 1/28/2025 1330  Gross per 24 hour   Intake 860 ml   Output 800 ml   Net 60 ml        No significant arrhythmias seen on telemetry review. Post cardioversion she continues in sinus rhythm.      Physical Exam:    GEN: Kat Elise appears well, alert and oriented x 3, pleasant and cooperative   HEENT: pupils equal, round, and reactive to light; extraocular muscles intact  NECK: supple, no carotid bruits   HEART: regular rhythm, normal S1 and S2, no murmurs, clicks, gallops or rubs   LUNGS: clear to auscultation bilaterally; no wheezes, rales, or rhonchi   ABDOMEN: normal bowel sounds, soft, no tenderness, no distention  EXTREMITIES: peripheral pulses normal; no clubbing, cyanosis, or edema  NEURO: no focal findings   SKIN: normal without suspicious lesions on exposed skin    Medications:      Current Facility-Administered Medications:     amLODIPine (NORVASC) tablet 2.5 mg, 2.5 mg, Oral, BID, Gema Guldin, PA-C, 2.5 mg at 01/28/25 0732    atorvastatin (LIPITOR) tablet 10 mg, 10 mg, Oral, Daily With Dinner, Gema Guldin, PA-C, 10 mg at 01/27/25 1751    flecainide (TAMBOCOR) tablet 100 mg, 100 mg, Oral, Q12H RELL, Meliton Dalton MD    fluticasone-vilanterol 200-25 mcg/actuation 1 puff, 1 puff, Inhalation, Daily, Gmea Guldin, PA-C, 1 puff at 01/28/25 0732    levalbuterol (XOPENEX) inhalation solution 0.63 mg, 0.63 mg, Nebulization, Q6H PRN, Jessica Roca MD    metoprolol tartrate (LOPRESSOR) tablet 100 mg, 100 mg, Oral, Q12H RELL, MENDEZ Kim    pantoprazole (PROTONIX) EC tablet 20 mg, 20 mg, Oral, Early Morning, Gema Guldin, PA-C, 20 mg at 01/28/25 0559    rivaroxaban (XARELTO) tablet 20 mg, 20 mg, Oral, Daily With Dinner, Gema Guldin, PA-C, 20 mg at 01/27/25 1751     Labs & Results:        Results from last 7 days   Lab Units 01/27/25  0541 01/26/25  1038   WBC Thousand/uL 6.85 7.18   HEMOGLOBIN g/dL 11.8 12.0   HEMATOCRIT % 37.9 38.8   PLATELETS Thousands/uL 308 350         Results from last 7 days   Lab Units 01/27/25  0541 01/26/25  1038   POTASSIUM mmol/L  3.7 4.0   CHLORIDE mmol/L 104 106   CO2 mmol/L 26 26   BUN mg/dL 14 13   CREATININE mg/dL 0.63 0.74   CALCIUM mg/dL 9.0 9.4   ALK PHOS U/L  --  81   ALT U/L  --  14   AST U/L  --  12*     Results from last 7 days   Lab Units 01/26/25  1038   INR  1.35*   PTT seconds 31     Results from last 7 days   Lab Units 01/26/25  1307   MAGNESIUM mg/dL 1.9         EKG personally reviewed by Meliton Dalton MD. Atrial fibrillation with a rapid ventricular response.    ECHO:    Left Ventricle: Left ventricular cavity size is normal. Wall thickness is upper normal. The left ventricular ejection fraction is 50-55%. Systolic function is low normal. Although no diagnostic regional wall motion abnormality was identified, this possibility cannot be completely excluded on the basis of this study.    Right Ventricle: Right ventricular cavity size is dilated. Systolic function is mildly reduced.    Left Atrium: The atrium is moderately dilated.    Right Atrium: The atrium is mildly dilated.    Mitral Valve: There is mild regurgitation.    Tricuspid Valve: There is mild regurgitation.    Aorta: The aortic root is normal in size. The ascending aorta is mildly dilated.    Counseling / Coordination of Care  Total floor / unit time spent today 25 minutes.  Greater than 50% of total time was spent with the patient and / or family counseling and / or coordination of care.

## 2025-01-28 NOTE — ANESTHESIA POSTPROCEDURE EVALUATION
Post-Op Assessment Note    CV Status:  Stable  Pain Score: 0    Pain management: adequate       Mental Status:  Alert and awake   Hydration Status:  Stable   PONV Controlled:  None   Airway Patency:  Patent     Post Op Vitals Reviewed: Yes    No anethesia notable event occurred.    Staff: Anesthesiologist           Last Filed PACU Vitals:  Vitals Value Taken Time   Temp     Pulse     BP     Resp     SpO2

## 2025-01-28 NOTE — PERIOPERATIVE NURSING NOTE
Vss. EKG done. Report given to juana on 3rd floor. Patient transported to room via stretcher with glasses.

## 2025-01-28 NOTE — INCIDENTAL FINDINGS
The following findings require follow up:  Radiographic finding   Finding: Small reticulonodular opacity in the periphery of the left lower lobe. Otherwise no areas of suspicious mass or consolidation.     Follow up required: Repeat CT chest in 3-6 months     Please notify the following clinician to assist with the follow up:   Dr. Sanabria, PCP    Incidental finding results were discussed with the Patient by Basia Castellano PA-C on 01/28/25.   They expressed understanding and all questions answered.

## 2025-01-28 NOTE — ASSESSMENT & PLAN NOTE
Continue Breo IH daily as substitute for once daily Advair  No wheeze on exam  Do not suspect acute exacerbation

## 2025-01-28 NOTE — ANESTHESIA PREPROCEDURE EVALUATION
Procedure:  PRE-OP ONLY    Recent influenza  in early January  On xarelto    Relevant Problems   CARDIO   (+) Hyperlipidemia   (+) Hypertension   (+) Rapid atrial fibrillation (HCC)      GI/HEPATIC   (+) Gastroesophageal reflux disease      MUSCULOSKELETAL   (+) Primary osteoarthritis of left knee      PULMONARY   (+) Dyspnea   (+) Moderate persistent asthma without complication   (+) PILAR (obstructive sleep apnea)      +CPAP  Class III obesity        Latest Reference Range & Units 01/27/25 05:41   Sodium 135 - 147 mmol/L 137   Potassium 3.5 - 5.3 mmol/L 3.7   Chloride 96 - 108 mmol/L 104   Carbon Dioxide 21 - 32 mmol/L 26   ANION GAP 4 - 13 mmol/L 7   BUN 5 - 25 mg/dL 14   Creatinine 0.60 - 1.30 mg/dL 0.63   GLUCOSE 65 - 140 mg/dL 131   Calcium 8.4 - 10.2 mg/dL 9.0   GFR, Calculated ml/min/1.73sq m 89        Latest Reference Range & Units 01/27/25 05:41   WBC 4.31 - 10.16 Thousand/uL 6.85   RBC 3.81 - 5.12 Million/uL 4.42   Hemoglobin 11.5 - 15.4 g/dL 11.8   Hematocrit 34.8 - 46.1 % 37.9   MCV 82 - 98 fL 86   MCH 26.8 - 34.3 pg 26.7 (L)   MCHC 31.4 - 37.4 g/dL 31.1 (L)   RDW 11.6 - 15.1 % 15.4 (H)   Platelet Count 149 - 390 Thousands/uL 308   (L): Data is abnormally low  (H): Data is abnormally high      ECHO (2025)       Left Ventricle: Left ventricular cavity size is normal. Wall thickness is upper normal. The left ventricular ejection fraction is 50-55%. Systolic function is low normal. Although no diagnostic regional wall motion abnormality was identified, this possibility cannot be completely excluded on the basis of this study.    Right Ventricle: Right ventricular cavity size is dilated. Systolic function is mildly reduced.    Left Atrium: The atrium is moderately dilated.    Right Atrium: The atrium is mildly dilated.    Mitral Valve: There is mild regurgitation.    Tricuspid Valve: There is mild regurgitation.    Aorta: The aortic root is normal in size. The ascending aorta is mildly  dilated.        Anesthesia Plan  ASA Score- 4     Anesthesia Type- IV sedation with anesthesia with ASA Monitors.         Additional Monitors:     Airway Plan:     Comment: NPO after MN    Patient educated on the possibility for awareness under sedation and of the possibility of airway intervention in the event of an airway or procedural emergency  .       Plan Factors-Exercise tolerance (METS): <4 METS.    Chart reviewed. EKG reviewed.  Existing labs reviewed. Patient summary reviewed.                  Induction- intravenous.    Postoperative Plan-     Perioperative Resuscitation Plan - Level 1 - Full Code.       Informed Consent- Anesthetic plan and risks discussed with patient.  I personally reviewed this patient with the CRNA. Discussed and agreed on the Anesthesia Plan with the CRNA..      NPO Status:  No vitals data found for the desired time range.

## 2025-01-28 NOTE — ASSESSMENT & PLAN NOTE
History of atrial fibrillation, has required prior cardioversion in September 2024, follows with [facility] cardiology  Presented with shortness of breath with A-fib RVR in the ED  Recently influenza positive, suspect this was triggering factor  Cardiology consulted  Home metoprolol XL was switched to metoprolol tartrate 100 mg twice daily  Initiated on flecainide 100 mg twice daily  Status post successful cardioversion 1/28  Continue home Xarelto  Cleared for discharge from cardiology standpoint, patient prefers to follow-up with her primary cardiologist at [place]

## 2025-01-28 NOTE — ASSESSMENT & PLAN NOTE
Patient with a history of rapid A-fib follows with [facility] cardiology and had a cardioversion in September 2024.  Patient states she previously was able to feel palpitations, but her primary symptom was fatigue with atrial fibrillation.      [id number]: 3  Anticoagulation: Xarelto 20 mg daily, has been compliant with all doses  Rate control: Today we uptitrated metoprolol to 50 mg every 6 hours.  We then converted her back to sinus rhythm.  She does have normal LV systolic function but biatrial enlargement.  I am going to add flecainide 100 mg twice daily.  Change metoprolol to 100 mg twice daily.  He is now asymptomatic and feeling well and can be discharged later today.  She follows with [facility] cardiology in which she will make a follow-up appointment.

## 2025-01-28 NOTE — DISCHARGE INSTR - AVS FIRST PAGE
Follow-up providers:  Please follow-up with PCP within 1 week of discharge to ensure you are feeling well following hospitalization  Please follow-up with outpatient cardiology within 1 to 2 weeks of discharge for further management of A-fib    Medications:  Please change your metoprolol from prior extended release tablet to metoprolol tartrate 100 mg twice daily  Please begin taking flecainide twice daily as prescribed  Please STOP taking hydrochlorothiazide  Please continue all other home medications as previously prescribed    Please return to the emergency room if you experience new fevers or chills, chest pain, difficulty breathing, lightheadedness, palpitations or any additional concerning symptoms

## 2025-01-28 NOTE — CASE MANAGEMENT
Case Management Discharge Planning Note    Patient name Kat Elise  Location /-01 MRN [id number]  : 1950 Date 2025       Current Admission Date: 2025  Current Admission Diagnosis:Rapid atrial fibrillation (HCC)   Patient Active Problem List    Diagnosis Date Noted Date Diagnosed    Gastroesophageal reflux disease 2025     Rapid atrial fibrillation (HCC) 2025     Dyspnea 2025     Hypertension 2025     Hyperlipidemia 2025     Primary osteoarthritis of left knee 2024     Pes anserinus bursitis of left knee 2024     Moderate persistent asthma without complication 2024     PILRA (obstructive sleep apnea) 2024     Adjustment insomnia 2024     Snoring 2024     Excessive daytime sleepiness 2024     Obesity, morbid (HCC) 2023       LOS (days): 2  Geometric Mean LOS (GMLOS) (days): 2.3  Days to GMLOS:0.2     OBJECTIVE:  Risk of Unplanned Readmission Score: 10.13         Current admission status: Inpatient   Preferred Pharmacy:   [facility] [address] [place] [id number]  Phone: [phone] Fax: [phone]    Primary Care Provider: Reece Sanabria MD    Primary Insurance: MEDICARE  Secondary Insurance: [employer]    DISCHARGE DETAILS:                 Notification made to OP CM Handoff: [facility] OP CM regarding discharge planning and disposition.                                                                         IMM Given (Date):: 25  IMM Given to:: Patient   IMM reviewed with patient, patient agrees with discharge determination.

## 2025-01-28 NOTE — ASSESSMENT & PLAN NOTE
Continue on pain 2.5 mg twice daily  Discontinued HCTZ  Metoprolol increased to 100 mg twice daily (changed to tartrate)  Blood pressure stable

## 2025-01-28 NOTE — ASSESSMENT & PLAN NOTE
She will be discharged on metoprolol 100 mg twice daily and amlodipine 2.5 mg twice daily.  Discontinue HCTZ.

## 2025-01-28 NOTE — PLAN OF CARE
Problem: SAFETY ADULT  Goal: Patient will remain free of falls  Description: INTERVENTIONS:  - Educate patient/family on patient safety including physical limitations  - Instruct patient to call for assistance with activity   - Consult OT/PT to assist with strengthening/mobility   - Keep Call bell within reach  - Keep bed low and locked with side rails adjusted as appropriate  - Keep care items and personal belongings within reach  - Initiate and maintain comfort rounds  - Make Fall Risk Sign visible to staff  - Offer Toileting every 2 Hours, in advance of need  - Initiate/Maintain 2 alarms  - Obtain necessary fall risk management equipment: bed in lowest position, call bell within reach.  - Apply yellow socks and bracelet for high fall risk patients  - Consider moving patient to room near nurses station  Outcome: Progressing  Goal: Maintain or return to baseline ADL function  Description: INTERVENTIONS:  -  Assess patient's ability to carry out ADLs; assess patient's baseline for ADL function and identify physical deficits which impact ability to perform ADLs (bathing, care of mouth/teeth, toileting, grooming, dressing, etc.)  - Assess/evaluate cause of self-care deficits   - Assess range of motion  - Assess patient's mobility; develop plan if impaired  - Assess patient's need for assistive devices and provide as appropriate  - Encourage maximum independence but intervene and supervise when necessary  - Involve family in performance of ADLs  - Assess for home care needs following discharge   - Consider OT consult to assist with ADL evaluation and planning for discharge  - Provide patient education as appropriate  Outcome: Progressing  Goal: Maintains/Returns to pre admission functional level  Description: INTERVENTIONS:  - Perform AM-PAC 6 Click Basic Mobility/ Daily Activity assessment daily.  - Set and communicate daily mobility goal to care team and patient/family/caregiver.   - Collaborate with rehabilitation  services on mobility goals if consulted  - Perform Range of Motion 2 times a day.  - Reposition patient every 2 hours.  - Dangle patient 2 times a day  - Stand patient 2 times a day  - Ambulate patient 2 times a day  - Out of bed to chair 2 times a day   - Out of bed for meals 2 times a day  - Out of bed for toileting  - Record patient progress and toleration of activity level   Outcome: Progressing     Problem: CARDIOVASCULAR - ADULT  Goal: Maintains optimal cardiac output and hemodynamic stability  Description: INTERVENTIONS:  - Monitor I/O, vital signs and rhythm  - Monitor for S/S and trends of decreased cardiac output  - Administer and titrate ordered vasoactive medications to optimize hemodynamic stability  - Assess quality of pulses, skin color and temperature  - Assess for signs of decreased coronary artery perfusion  - Instruct patient to report change in severity of symptoms  Outcome: Progressing  Goal: Absence of cardiac dysrhythmias or at baseline rhythm  Description: INTERVENTIONS:  - Continuous cardiac monitoring, vital signs, obtain 12 lead EKG if ordered  - Administer antiarrhythmic and heart rate control medications as ordered  - Monitor electrolytes and administer replacement therapy as ordered  Outcome: Progressing     Problem: Knowledge Deficit  Goal: Patient/family/caregiver demonstrates understanding of disease process, treatment plan, medications, and discharge instructions  Description: Complete learning assessment and assess knowledge base.  Interventions:  - Provide teaching at level of understanding  - Provide teaching via preferred learning methods  Outcome: Progressing

## 2025-01-28 NOTE — DISCHARGE SUMMARY
Discharge Summary - Hospitalist   Name: Kat Elise 74 y.o. female I MRN: [id number]  Unit/Bed#: -01 I Date of Admission: 1/26/2025   Date of Service: 1/28/2025 I Hospital Day: 2     Assessment & Plan  Rapid atrial fibrillation (HCC)  History of atrial fibrillation, has required prior cardioversion in September 2024, follows with [facility] cardiology  Presented with shortness of breath with A-fib RVR in the ED  Recently influenza positive, suspect this was triggering factor  Cardiology consulted  Home metoprolol XL was switched to metoprolol tartrate 100 mg twice daily  Initiated on flecainide 100 mg twice daily  Status post successful cardioversion 1/28  Continue home Xarelto  Cleared for discharge from cardiology standpoint, patient prefers to follow-up with her primary cardiologist at [place]  Obesity, morbid (HCC)  Dietary and lifestyle modifications  PILAR (obstructive sleep apnea)  Continue CPAP hs  Moderate persistent asthma without complication  Continue Breo IH daily as substitute for once daily Advair  No wheeze on exam  Do not suspect acute exacerbation  Dyspnea  Worsening SOB and ROMERO since flu infection  Breathing comfortably on RA, no O2 requirement  No wheezing noted on exam, appears euvolemic  CXR with no acute consolidation or congestion, mild cardiomegaly  CT chest : Small reticulonodular opacity in the periphery of the left lower lobe. Otherwise no areas of suspicious mass or consolidation  Echocardiogram updated: EF 50 to 55%, abnormal diastolic inflow due to atrial fibrillation  Resolved, suspect shortness of breath secondary to A-fib RVR  No further shortness of breath, cleared for discharge  Stable on room air  Recommend repeat CT chest in 3 to 6 months to ensure resolution of finding as above  Hypertension  Continue on pain 2.5 mg twice daily  Discontinued HCTZ  Metoprolol increased to 100 mg twice daily (changed to tartrate)  Blood pressure stable  Hyperlipidemia  Continue home  statin  Gastroesophageal reflux disease       Medical Problems       Resolved Problems  Date Reviewed: 1/26/2025   None       Discharging Physician / Practitioner: Basia Castellano PA-C  PCP: Reece Sanabria MD  Admission Date:   Admission Orders (From admission, onward)       Ordered        01/26/25 1342  INPATIENT ADMISSION  Once                          Discharge Date: 01/28/25    Consultations During Hospital Stay:  Cardiology    Procedures Performed:   Cardioversion 1/28/2025    Significant Findings / Test Results:   CT chest without contrast   Final Result by Ruth Barkley MD (01/26 1326)      Small reticulonodular opacity in the periphery of the left lower lobe. Otherwise no areas of suspicious mass or consolidation.      Partially visualized nonobstructing left renal calculus. New compared to 2017.               Workstation performed: [id number]         XR chest 2 views   Final Result by Carlene Basilio MD (01/26 1351)   No acute consolidation or congestion   Mild cardiomegaly            Resident: Arturo Nash I, the attending radiologist, have reviewed the images and agree with the final report above.      Workstation performed: [id number]           Echocardiogram 1/28: Left Ventricle: Left ventricular cavity size is normal. Wall thickness is upper normal. The left ventricular ejection fraction is 50-55%. Systolic function is low normal. Although no diagnostic regional wall motion abnormality was identified, this possibility cannot be completely excluded on the basis of this study.    Right Ventricle: Right ventricular cavity size is dilated. Systolic function is mildly reduced.    Left Atrium: The atrium is moderately dilated.    Right Atrium: The atrium is mildly dilated.    Mitral Valve: There is mild regurgitation.    Tricuspid Valve: There is mild regurgitation.    Aorta: The aortic root is normal in size. The ascending aorta is mildly dilated.    Incidental  Findings:   Small reticulonodular opacity in the periphery of the left lower lobe. Otherwise no areas of suspicious mass or consolidation.    I reviewed the above mentioned incidental findings with the patient and/or family and they expressed understanding.    Test Results Pending at Discharge (will require follow up):   none     Outpatient Tests Requested:  Would obtain repeat CT chest in 3 months to ensure resolution of left lower lobe opacity    Complications:  none    Reason for Admission: Shortness of breath, irregular heart rate    Hospital Course:   Kat Elise is a 74 y.o. female patient with PMH of obesity, sleep apnea, history of atrial fibrillation on Xarelto status post prior cardioversion in September, asthma, hypertension, hyperlipidemia, who originally presented to the hospital on 1/26/2025 due to shortness of breath with dyspnea on exertion, along with irregular/heart rate noted on her fitness watch.  Patient was found to be in atrial fibrillation with RVR on admission.  She notes she recently was sick with the flu in early January, this was thought to be precipitating factor as patient had otherwise been previously well-controlled since her cardioversion recently in September.  CT chest was obtained in the ED which did show left-sided opacity however patient was without any SIRS criteria concerning for acute bacterial pneumonia.  She was monitored off antibiotics without fevers, cough, or leukocytosis.  Cardiology was consulted and patient was treated with increased doses of metoprolol.  Given persistent uncontrolled rates on telemetry, patient underwent cardioversion on 1/28 which was successful with 1 shock administered.  Echocardiogram showed EF 50 to 55% without significant valvular abnormality.  Cardiology increased patient's metoprolol and added flecainide.  Patient is cleared for discharge from cardiology standpoint with outpatient follow-up with her primary cardiologist at  [facility].      Patient also encouraged to follow-up with PCP within 1 week of discharge.  Given finding on CT chest, recommend patient follow-up with PCP for repeat CT chest in 3 to 6 months to ensure resolution of this finding.  Discussed with patient at bedside, verbalized agreement.    Throughout her admission, patient did not require any supplemental oxygen and remained hemodynamically stable.  Labs and vitals remained stable at discharge.  Patient ambulating independently the room without rehab needs.   will transport patient home.    Please see above list of diagnoses and related plan for additional information.     Condition at Discharge: good    Discharge Day Visit / Exam:   Subjective: Patient feeling very well since her cardioversion, looking forward to eating dinner, she is ordered a diet.  No further shortness of breath at this time, no chest pain, or other complaints.  Saturating well on room air at rest and with exertion.   is going to come take her home.    Vitals: Blood Pressure: 124/73 (01/28/25 1440)  Pulse: 70 (01/28/25 1440)  Temperature: 97.9 °F (36.6 °C) (01/28/25 1440)  Temp Source: Oral (01/27/25 2122)  Respirations: 18 (01/28/25 1349)  Height: 5' (152.4 cm) (01/28/25 1112)  Weight - Scale: 96.7 kg (213 lb 3 oz) (01/28/25 1112)  SpO2: 93 % (01/28/25 1440)  Physical Exam  Vitals and nursing note reviewed.   Constitutional:       General: She is not in acute distress.     Appearance: She is well-developed. She is not ill-appearing.   HENT:      Head: Normocephalic and atraumatic.   Eyes:      General:         Right eye: No discharge.         Left eye: No discharge.      Extraocular Movements: Extraocular movements intact.      Conjunctiva/sclera: Conjunctivae normal.   Cardiovascular:      Rate and Rhythm: Normal rate and regular rhythm.      Heart sounds: No murmur heard.  Pulmonary:      Effort: Pulmonary effort is normal. No respiratory distress.      Breath sounds:  Normal breath sounds. No wheezing, rhonchi or rales.   Abdominal:      General: Bowel sounds are normal. There is no distension.      Palpations: Abdomen is soft.      Tenderness: There is no abdominal tenderness.   Musculoskeletal:      Cervical back: Neck supple.      Right lower leg: No edema.      Left lower leg: No edema.   Skin:     General: Skin is warm and dry.      Capillary Refill: Capillary refill takes less than 2 seconds.   Neurological:      General: No focal deficit present.      Mental Status: She is alert and oriented to person, place, and time. Mental status is at baseline.      Cranial Nerves: No cranial nerve deficit.   Psychiatric:         Mood and Affect: Mood normal.         Behavior: Behavior normal.          Discussion with Family: Patient declined call to .     Discharge instructions/Information to patient and family:   See after visit summary for information provided to patient and family.      Provisions for Follow-Up Care:  See after visit summary for information related to follow-up care and any pertinent home health orders.      Mobility at time of Discharge:   Basic Mobility Inpatient Raw Score: 24  JH-HLM Goal: 8: Walk 250 feet or more  JH-HLM Achieved: 8: Walk 250 feet ot more  HLM Goal achieved. Continue to encourage appropriate mobility.     Disposition:   Home    Planned Readmission: none     Discharge Medications:  See after visit summary for reconciled discharge medications provided to patient and/or family.      Administrative Statements   Discharge Statement:  I have spent a total time of 45 minutes in caring for this patient on the day of the visit/encounter. >30 minutes of time was spent on: Diagnostic results, Counseling / Coordination of care, Documenting in the medical record, Reviewing / ordering tests, medicine, procedures  , and Communicating with other healthcare professionals .    **Please Note: This note may have been constructed using a voice  recognition system**

## 2025-01-28 NOTE — ANESTHESIA PREPROCEDURE EVALUATION
Procedure:  CARDIOVERSION    Recent influenza  in early January  On xarelto    Relevant Problems   CARDIO   (+) Hyperlipidemia   (+) Hypertension   (+) Rapid atrial fibrillation (HCC)      GI/HEPATIC   (+) Gastroesophageal reflux disease      MUSCULOSKELETAL   (+) Primary osteoarthritis of left knee      PULMONARY   (+) Dyspnea   (+) Moderate persistent asthma without complication   (+) PILAR (obstructive sleep apnea)      +CPAP  Class III obesity  Physical Exam    Airway    Mallampati score: II  TM Distance: >3 FB  Neck ROM: full     Dental   Comment: None loose, No notable dental hx     Cardiovascular      Pulmonary      Other Findings  post-pubertal.       Latest Reference Range & Units 01/27/25 05:41   Sodium 135 - 147 mmol/L 137   Potassium 3.5 - 5.3 mmol/L 3.7   Chloride 96 - 108 mmol/L 104   Carbon Dioxide 21 - 32 mmol/L 26   ANION GAP 4 - 13 mmol/L 7   BUN 5 - 25 mg/dL 14   Creatinine 0.60 - 1.30 mg/dL 0.63   GLUCOSE 65 - 140 mg/dL 131   Calcium 8.4 - 10.2 mg/dL 9.0   GFR, Calculated ml/min/1.73sq m 89        Latest Reference Range & Units 01/27/25 05:41   WBC 4.31 - 10.16 Thousand/uL 6.85   RBC 3.81 - 5.12 Million/uL 4.42   Hemoglobin 11.5 - 15.4 g/dL 11.8   Hematocrit 34.8 - 46.1 % 37.9   MCV 82 - 98 fL 86   MCH 26.8 - 34.3 pg 26.7 (L)   MCHC 31.4 - 37.4 g/dL 31.1 (L)   RDW 11.6 - 15.1 % 15.4 (H)   Platelet Count 149 - 390 Thousands/uL 308   (L): Data is abnormally low  (H): Data is abnormally high      ECHO (2025)       Left Ventricle: Left ventricular cavity size is normal. Wall thickness is upper normal. The left ventricular ejection fraction is 50-55%. Systolic function is low normal. Although no diagnostic regional wall motion abnormality was identified, this possibility cannot be completely excluded on the basis of this study.    Right Ventricle: Right ventricular cavity size is dilated. Systolic function is mildly reduced.    Left Atrium: The atrium is moderately dilated.    Right Atrium: The atrium  is mildly dilated.    Mitral Valve: There is mild regurgitation.    Tricuspid Valve: There is mild regurgitation.    Aorta: The aortic root is normal in size. The ascending aorta is mildly dilated.        Anesthesia Plan  ASA Score- 4     Anesthesia Type- IV sedation with anesthesia with ASA Monitors.         Additional Monitors:     Airway Plan:     Comment: NPO after MN (sip of water with meds)    Patient educated on the possibility for awareness under sedation and of the possibility of airway intervention in the event of an airway or procedural emergency  .       Plan Factors-Exercise tolerance (METS): <4 METS.    Chart reviewed. EKG reviewed.  Existing labs reviewed. Patient summary reviewed.    Patient is not a current smoker.              Induction- intravenous.    Postoperative Plan-     Perioperative Resuscitation Plan - Level 1 - Full Code.       Informed Consent- Anesthetic plan and risks discussed with patient.  I personally reviewed this patient with the CRNA. Discussed and agreed on the Anesthesia Plan with the CRNA..      NPO Status:  No vitals data found for the desired time range.

## 2025-01-29 LAB
QRS AXIS: -17 DEGREES
QRSD INTERVAL: 76 MS
QT INTERVAL: 328 MS
QTC INTERVAL: 457 MS
T WAVE AXIS: 8 DEGREES
VENTRICULAR RATE: 117 BPM

## 2025-01-29 PROCEDURE — 93010 ELECTROCARDIOGRAM REPORT: CPT | Performed by: INTERNAL MEDICINE

## 2025-05-27 ENCOUNTER — HOSPITAL ENCOUNTER (OUTPATIENT)
Dept: CT IMAGING | Facility: HOSPITAL | Age: 75
Discharge: HOME | End: 2025-05-27
Attending: FAMILY MEDICINE
Payer: MEDICARE

## 2025-05-27 DIAGNOSIS — R91.1 SOLITARY PULMONARY NODULE: ICD-10-CM

## 2025-05-27 PROCEDURE — 71250 CT THORAX DX C-: CPT

## 2025-06-04 ENCOUNTER — TELEPHONE (OUTPATIENT)
Age: 75
End: 2025-06-04

## 2025-06-04 ENCOUNTER — OFFICE VISIT (OUTPATIENT)
Age: 75
End: 2025-06-04
Payer: MEDICARE

## 2025-06-04 VITALS
WEIGHT: 215 LBS | BODY MASS INDEX: 42.21 KG/M2 | HEIGHT: 60 IN | DIASTOLIC BLOOD PRESSURE: 80 MMHG | TEMPERATURE: 97.3 F | SYSTOLIC BLOOD PRESSURE: 128 MMHG | HEART RATE: 71 BPM | OXYGEN SATURATION: 95 %

## 2025-06-04 DIAGNOSIS — G47.19 EXCESSIVE DAYTIME SLEEPINESS: ICD-10-CM

## 2025-06-04 DIAGNOSIS — E66.01 OBESITY, MORBID (HCC): ICD-10-CM

## 2025-06-04 DIAGNOSIS — G47.33 OSA (OBSTRUCTIVE SLEEP APNEA): Primary | ICD-10-CM

## 2025-06-04 DIAGNOSIS — J30.89 NON-SEASONAL ALLERGIC RHINITIS, UNSPECIFIED TRIGGER: ICD-10-CM

## 2025-06-04 DIAGNOSIS — J45.40 MODERATE PERSISTENT ASTHMA WITHOUT COMPLICATION: ICD-10-CM

## 2025-06-04 PROCEDURE — G2211 COMPLEX E/M VISIT ADD ON: HCPCS | Performed by: INTERNAL MEDICINE

## 2025-06-04 PROCEDURE — 99214 OFFICE O/P EST MOD 30 MIN: CPT | Performed by: INTERNAL MEDICINE

## 2025-06-04 RX ORDER — AZELASTINE 1 MG/ML
1 SPRAY, METERED NASAL 2 TIMES DAILY
Qty: 1 ML | Refills: 3 | Status: SHIPPED | OUTPATIENT
Start: 2025-06-04

## 2025-06-04 NOTE — ASSESSMENT & PLAN NOTE
Reviewed the compliance today she is on DreamStation 1 set in 2019 100% using the machine over the last 90 days residual AHI 0.9 no significant mask leak  She will need an order for new CPAP machine as she is eligible, we will place her order today and will see here for compliance visit 31 to 90 days afterwards  Orders:    CPAP Auto New DME

## 2025-06-04 NOTE — PROGRESS NOTES
Follow-up  Visit - Pulmonary Medicine   Name: Kat Elise      : 1950      MRN: [id number]  Encounter Provider: Galileo Day MD  Encounter Date: 2025   Encounter department: [facility] [place]  :  Assessment & Plan  PILAR (obstructive sleep apnea)  Reviewed the compliance today she is on DreamStation 1 set in 2019 100% using the machine over the last 90 days residual AHI 0.9 no significant mask leak  She will need an order for new CPAP machine as she is eligible, we will place her order today and will see here for compliance visit 31 to 90 days afterwards  Orders:    CPAP Auto New DME    Excessive daytime sleepiness  Continue using the CPAP as detailed above  Orders:    CPAP Auto New DME    Non-seasonal allergic rhinitis, unspecified trigger  We will start a trial of azelastine nasal spray  Orders:    azelastine (ASTELIN) 0.1 % nasal spray; 1 spray into each nostril 2 (two) times a day Use in each nostril as directed    Moderate persistent asthma without complication  On Advair       Obesity, morbid (HCC)  BMI 41.99 kg/m2  Noted, she would benefit from          Return 31-90 days initial compliance after getting the new CPAP machine.    History of Present Illness   Kat Elise is a 75 y.o. female who presents for follow-up appointment last seen in May 2024    Known history of bronchial asthma, obstructive sleep apnea has been using CPAP since 2019, no changes of her symptoms she has been having some allergic rhinitis symptoms with postnasal drip more recently this spring triggering her cough sometimes     Review of Systems   Constitutional:  Negative for appetite change and fever.   HENT:  Positive for postnasal drip, rhinorrhea, sneezing and trouble swallowing. Negative for ear pain and sore throat.    Respiratory:  Positive for cough, shortness of breath and wheezing.    Cardiovascular:  Negative for chest pain.   Musculoskeletal:  Positive for myalgias.    Neurological:  Negative for headaches.   All other systems reviewed and are negative.      Aside from what is mentioned in the HPI, ROS is otherwise negative    Medical History Reviewed by provider this encounter:     .  Past Medical History   Past Medical History[1]  Past Surgical History[2]  Family History[3]   reports that she has never smoked. She has never used smokeless tobacco. She reports current alcohol use. She reports current drug use.  Current Outpatient Medications   Medication Instructions    amLODIPine (NORVASC) 5 mg tablet 2.5 in am and 2.5 in pm    atorvastatin (LIPITOR) 10 mg tablet     azelastine (ASTELIN) 0.1 % nasal spray 1 spray, Nasal, 2 times daily, Use in each nostril as directed    flecainide (TAMBOCOR) 100 mg, Oral, Every 12 hours scheduled    Fluticasone-Salmeterol (Advair Diskus) 250-50 mcg/dose inhaler     metoprolol tartrate (LOPRESSOR) 100 mg, Oral, Every 12 hours scheduled    omeprazole (PriLOSEC) 20 mg delayed release capsule     psyllium (METAMUCIL) packet 1 packet, Daily    sodium picosulfate, magnesium oxide, citric acid (Clenpiq) oral solution Take 175 mL (1 bottle) the evening before the colonoscopy, between 5 PM and 9 PM, followed by a second 175 mL bottle 5 hours before the colonoscopy.    Xarelto 20 MG tablet    Allergies[4]   Medications Ordered Prior to Encounter[5]   Social History[6]     Medical History Reviewed by provider this encounter:     .    Objective   /80 (BP Location: Left arm, Patient Position: Sitting, Cuff Size: Standard)   Pulse 71   Temp (!) 97.3 °F (36.3 °C) (Tympanic)   Ht 5' (1.524 m)   Wt 97.5 kg (215 lb)   SpO2 95%   BMI 41.99 kg/m²     Physical Exam  Constitutional:       Appearance: Normal appearance. She is not ill-appearing or diaphoretic.   HENT:      Head: Normocephalic and atraumatic.      Nose: No congestion or rhinorrhea.      Mouth/Throat:      Mouth: Mucous membranes are moist.      Pharynx: Oropharynx is clear.     Eyes:       General: No scleral icterus.        Right eye: No discharge.         Left eye: No discharge.      Extraocular Movements: Extraocular movements intact.       Cardiovascular:      Rate and Rhythm: Normal rate and regular rhythm.      Pulses: Normal pulses.      Heart sounds: Normal heart sounds. No murmur heard.     No gallop.   Pulmonary:      Effort: Pulmonary effort is normal. No respiratory distress.      Breath sounds: Normal breath sounds. No wheezing, rhonchi or rales.     Musculoskeletal:         General: No swelling, tenderness or deformity.      Cervical back: No rigidity.     Skin:     General: Skin is warm and dry.     Neurological:      General: No focal deficit present.      Mental Status: She is alert and oriented to person, place, and time. Mental status is at baseline.     Psychiatric:         Mood and Affect: Mood normal.         Behavior: Behavior normal.         Thought Content: Thought content normal.         Judgment: Judgment normal.           Diagnostic Data:  Labs: I personally reviewed the most recent laboratory data pertinent to today's visit.      Radiology results:  Radiology Results Review : No pertinent imaging studies reviewed.        Administrative Statements   I have spent a total time of 30 minutes in caring for this patient on the day of the visit/encounter including Impressions, Documenting in the medical record, and Reviewing/placing orders in the medical record (including tests, medications, and/or procedures).      Galileo Day MD           [1]   Past Medical History:  Diagnosis Date    Asthma     Atrial fibrillation (HCC)     CPAP (continuous positive airway pressure) dependence     GERD (gastroesophageal reflux disease)     History of diverticulitis     Hyperlipemia     Hypertension     Impaired fasting glucose     Sleep apnea    [2]   Past Surgical History:  Procedure Laterality Date    APPENDECTOMY      CARDIOVERSION       SECTION      COLON SURGERY      for  obstrution    HYSTERECTOMY      TONSILLECTOMY      TUBAL LIGATION     [3]   Family History  Problem Relation Name Age of Onset    Colon cancer Paternal Grandmother     [4]   Allergies  Allergen Reactions    Iv Dye [Iodinated Contrast Media] Hives    Levofloxacin Other (See Comments) and Itching     itching    Zocor [Simvastatin] Swelling     Swelling in hands when she took generic zocor    [5]   Current Outpatient Medications on File Prior to Visit   Medication Sig Dispense Refill    amLODIPine (NORVASC) 5 mg tablet 2.5 in am and 2.5 in pm      atorvastatin (LIPITOR) 10 mg tablet       flecainide (TAMBOCOR) 100 mg tablet Take 1 tablet (100 mg total) by mouth every 12 (twelve) hours 60 tablet 0    Fluticasone-Salmeterol (Advair Diskus) 250-50 mcg/dose inhaler       metoprolol tartrate (LOPRESSOR) 100 mg tablet Take 1 tablet (100 mg total) by mouth every 12 (twelve) hours 60 tablet 0    omeprazole (PriLOSEC) 20 mg delayed release capsule       psyllium (METAMUCIL) packet Take 1 packet by mouth in the morning.      Xarelto 20 MG tablet       sodium picosulfate, magnesium oxide, citric acid (Clenpiq) oral solution Take 175 mL (1 bottle) the evening before the colonoscopy, between 5 PM and 9 PM, followed by a second 175 mL bottle 5 hours before the colonoscopy. (Patient not taking: Reported on 6/4/2025) 350 mL 0     No current facility-administered medications on file prior to visit.   [6]   Social History  Tobacco Use    Smoking status: Never    Smokeless tobacco: Never   Vaping Use    Vaping status: Never Used   Substance and Sexual Activity    Alcohol use: Yes     Comment: occasionally    Drug use: Yes    Sexual activity: Not Currently     Partners: Male

## 2025-06-04 NOTE — PATIENT INSTRUCTIONS
Patient Education     Azelastine (Nasal) (a ZEL as teen)   Brand Names: US AstePro Childrens [OTC]; Astepro [OTC]   What is this drug used for?   It is used to ease allergy signs.  It is used to treat a runny nose.  What do I need to tell my doctor BEFORE I take this drug?   If you are allergic to this drug; any part of this drug; or any other drugs, foods, or substances. Tell your doctor about the allergy and what signs you had.  This drug may interact with other drugs or health problems.  Tell your doctor and pharmacist about all of your drugs (prescription or OTC, natural products, vitamins) and health problems. You must check to make sure that it is safe for you to take this drug with all of your drugs and health problems. Do not start, stop, or change the dose of any drug without checking with your doctor.  What are some things I need to know or do while I take this drug?   Tell all of your health care providers that you take this drug. This includes your doctors, nurses, pharmacists, and dentists.  Avoid driving and doing other tasks or actions that call for you to be alert until you see how this drug affects you.  If you have had any recent nose surgery, injury, ulcers, or sores, talk with your doctor.  Avoid drinking alcohol while taking this drug.  Talk with your doctor before you use marijuana, other forms of cannabis, or prescription or OTC drugs that may slow your actions.  If this drug is taken by mouth, get medical help or call a poison control center right away.  Tell your doctor if you are pregnant, plan on getting pregnant, or are breast-feeding. You will need to talk about the benefits and risks to you and the baby.  What are some side effects that I need to call my doctor about right away?   WARNING/CAUTION: Even though it may be rare, some people may have very bad and sometimes deadly side effects when taking a drug. Tell your doctor or get medical help right away if you have any of the following  signs or symptoms that may be related to a very bad side effect:  Signs of an allergic reaction, like rash; hives; itching; red, swollen, blistered, or peeling skin with or without fever; wheezing; tightness in the chest or throat; trouble breathing, swallowing, or talking; unusual hoarseness; or swelling of the mouth, face, lips, tongue, or throat.  Eye irritation.  What are some other side effects of this drug?   All drugs may cause side effects. However, many people have no side effects or only have minor side effects. Call your doctor or get medical help if any of these side effects or any other side effects bother you or do not go away:  Nose or throat irritation.  Sneezing.  Burning or stinging.  Feeling sleepy.  Headache.  Cough.  Change in taste.  Dry mouth.  Nosebleed.  These are not all of the side effects that may occur. If you have questions about side effects, call your doctor. Call your doctor for medical advice about side effects.  You may report side effects to your national health agency.  You may report side effects to the FDA at 1-583.543.4750. You may also report side effects at https://www.fda.gov/medwatch.  How is this drug best taken?   Use this drug as ordered by your doctor. Read all information given to you. Follow all instructions closely.  Use as you have been told, even if your signs get better.  Do not take this drug by mouth. Use in your nose only. Keep out of your mouth and eyes (may burn).  Some products may have different ways to prime the pump. Some pumps may also need to be primed if not used for different periods of time. Follow how and when to prime as you have been told.  Blow your nose before use.  Spray up the nose only. Do not spray onto the wall joining the two nostrils.  You may get a bitter taste in your mouth. To prevent this, tilt your head downward while using.  Clean the spray tip with a clean tissue or cloth after use. If the spray tip gets clogged, clean it as you  have been told or read the package insert.  What do I do if I miss a dose?   Use a missed dose as soon as you think about it.  If it is close to the time for your next dose, skip the missed dose and go back to your normal time.  Do not use 2 doses at the same time or extra doses.  How do I store and/or throw out this drug?   Store upright at room temperature. Do not freeze.  Store in a dry place. Do not store in a bathroom.  Keep all drugs in a safe place. Keep all drugs out of the reach of children and pets.  Throw away unused or  drugs. Do not flush down a toilet or pour down a drain unless you are told to do so. Check with your pharmacist if you have questions about the best way to throw out drugs. There may be drug take-back programs in your area.  General drug facts   If your symptoms or health problems do not get better or if they become worse, call your doctor.  Do not share your drugs with others and do not take anyone else's drugs.  Some drugs may have another patient information leaflet. If you have any questions about this drug, please talk with your doctor, nurse, pharmacist, or other health care provider.  Some drugs may have another patient information leaflet. Check with your pharmacist. If you have any questions about this drug, please talk with your doctor, nurse, pharmacist, or other health care provider.  If you think there has been an overdose, call your poison control center or get medical care right away. Be ready to tell or show what was taken, how much, and when it happened.  Consumer Information Use and Disclaimer   This generalized information is a limited summary of diagnosis, treatment, and/or medication information. It is not meant to be comprehensive and should be used as a tool to help the user understand and/or assess potential diagnostic and treatment options. It does NOT include all information about conditions, treatments, medications, side effects, or risks that may apply  to a specific patient. It is not intended to be medical advice or a substitute for the medical advice, diagnosis, or treatment of a health care provider based on the health care provider's examination and assessment of a patient's specific and unique circumstances. Patients must speak with a health care provider for complete information about their health, medical questions, and treatment options, including any risks or benefits regarding use of medications. This information does not endorse any treatments or medications as safe, effective, or approved for treating a specific patient. Up[employer] and its affiliates disclaim any warranty or liability relating to this information or the use thereof. The use of this information is governed by the Terms of Use, available at https://www.wolterskluwer.com/en/know/clinical-effectiveness-terms.  Last Reviewed Date   2021-07-06  Copyright   © 2024 [employer] and its affiliates and/or licensors. All rights reserved.

## 2025-06-04 NOTE — ASSESSMENT & PLAN NOTE
We will start a trial of azelastine nasal spray  Orders:    azelastine (ASTELIN) 0.1 % nasal spray; 1 spray into each nostril 2 (two) times a day Use in each nostril as directed

## 2025-06-05 NOTE — PROGRESS NOTES
Order placed for CPAP New Auto DME with [facility] via [facility].  Noted in order Pt is established with [facility] and they have her Sleep Study (office does not).

## 2025-06-25 ENCOUNTER — TELEPHONE (OUTPATIENT)
Age: 75
End: 2025-06-25

## 2025-06-25 NOTE — TELEPHONE ENCOUNTER
Patient calling as she would like for her cpap order to be processed as she is eager to start her treatment

## 2025-06-26 LAB

## 2025-06-26 NOTE — TELEPHONE ENCOUNTER
"CPAP Order placed with [facility] 6/4/25 but when checking [place], Order was not \"pushed\" through. Order now 'pushed' to [facility] for processing. Pt should receive a call within 5-7 business days.  "

## 2025-07-02 LAB

## 2025-07-07 ENCOUNTER — TELEPHONE (OUTPATIENT)
Age: 75
End: 2025-07-07

## 2025-07-07 NOTE — TELEPHONE ENCOUNTER
Patient called to confirm if appointment 8/15 for cpap compliance would still work as she was notified today her cpap would be delivered within 5-7 days. I advised her if she started using machine by 7/14 that appointment time would work. I advised her if anything changes or machine is delayed to call and we can reschedule to appropriate time frame. Thank you.

## 2025-07-09 LAB
DME PARACHUTE DELIVERY DATE ACTUAL: NORMAL
DME PARACHUTE DELIVERY DATE REQUESTED: NORMAL
DME PARACHUTE DELIVERY NOTE: NORMAL
DME PARACHUTE ITEM DESCRIPTION: NORMAL
DME PARACHUTE ORDER STATUS: NORMAL
DME PARACHUTE SUPPLIER NAME: NORMAL
DME PARACHUTE SUPPLIER PHONE: NORMAL

## 2025-07-15 ENCOUNTER — OFFICE VISIT (OUTPATIENT)
Dept: CARDIOLOGY CLINIC | Facility: CLINIC | Age: 75
End: 2025-07-15
Payer: MEDICARE

## 2025-07-15 VITALS
OXYGEN SATURATION: 94 % | HEIGHT: 60 IN | SYSTOLIC BLOOD PRESSURE: 128 MMHG | WEIGHT: 222 LBS | HEART RATE: 81 BPM | DIASTOLIC BLOOD PRESSURE: 78 MMHG | BODY MASS INDEX: 43.59 KG/M2

## 2025-07-15 DIAGNOSIS — G47.33 OSA (OBSTRUCTIVE SLEEP APNEA): ICD-10-CM

## 2025-07-15 DIAGNOSIS — E78.5 HYPERLIPIDEMIA, UNSPECIFIED HYPERLIPIDEMIA TYPE: ICD-10-CM

## 2025-07-15 DIAGNOSIS — I48.91 RAPID ATRIAL FIBRILLATION (HCC): Primary | ICD-10-CM

## 2025-07-15 DIAGNOSIS — E66.01 OBESITY, MORBID (HCC): ICD-10-CM

## 2025-07-15 DIAGNOSIS — I10 HYPERTENSION, UNSPECIFIED TYPE: ICD-10-CM

## 2025-07-15 PROCEDURE — 99213 OFFICE O/P EST LOW 20 MIN: CPT

## 2025-07-15 NOTE — PROGRESS NOTES
Cardiology Follow Up    Kat Elise  1950  [id number]  [facility] [place] [id number]  [address] [address]  [phone]    1. Rapid atrial fibrillation (HCC)  CANCELED: POCT ECG      2. Hypertension, unspecified type        3. Hyperlipidemia, unspecified hyperlipidemia type        4. Obesity, morbid (HCC)        5. PILAR (obstructive sleep apnea)        Discussion/Summary:  1.  Atrial fibrillation  Previously followed with [facility] cardiology -cardioversion in September 2024.  Hospitalized in January of this year with increased fatigue and was cardioverted once again.  She was discharged on flecainide 100 mg twice daily, which was added to her regimen, metoprolol tartrate 100 mg twice daily.  She takes Xarelto 20 mg for stroke prevention.  Patient has a regular rate and rhythm today.  She denies any further fatigue since she has been home  .  Continue flecainide, metoprolol, Xarelto as ordered    2.  Hypertension  Amlodipine 2.5 mg in the morning and 2.5 mg at night along with the metoprolol as above.  Blood pressure excellent in the office today she reports that her blood pressure typically is 130s over 70s at home.  Continue amlodipine as ordered    3.  Hyperlipidemia  She takes atorvastatin 10 mg daily.  She is previously followed at [facility] I do not have  a recent lipid panel to review but she last saw her cardiologist in May and it lipid profile was done at that time and she reports that no changes were made.   Continue atorvastatin    4.  Obstructive sleep apnea  Follows with [facility] sleep lab -compliant with CPAP at home    Interval History:   Kat Elise is a 75 y.o. female with a past medical history of paroxysmal atrial fibrillation, hypertension, hyperlipidemia, obstructive sleep apnea, obesity who would like to establish care with [facility] cardiology.  She was  previously a patient of [facility] and with her cardiologist moving to [place] she would like to seek care closer to home.      She was seen at [facility] emergency department in January 2025 with increased fatigue and was found to be in A-fib with rapid ventricular response.  Her medications were adjusted to include flecainide and she was cardioverted into normal sinus rhythm and then discharged home.      Since this event she was seen by her regular cardiologist at [facility] and no adjustments to her medications were made at that appointment.  She states she has been feeling well without any further fatigue like she was experiencing prior to coming to the hospital.  She denies any chest pain, chest pressure, lightheadedness or dizziness at this time. she has not had any presyncopal or syncopal episodes at home.    Medical Problems       Problem List       Obesity, morbid (HCC)    PILAR (obstructive sleep apnea)    Adjustment insomnia    Snoring    Excessive daytime sleepiness    Moderate persistent asthma without complication    Pes anserinus bursitis of left knee    Primary osteoarthritis of left knee    Rapid atrial fibrillation (HCC)    Dyspnea    Hypertension    Hyperlipidemia    Gastroesophageal reflux disease    Non-seasonal allergic rhinitis        Past Medical History[1]  Social History     Socioeconomic History    Marital status: /Civil Union     Spouse name: Not on file    Number of children: Not on file    Years of education: Not on file    Highest education level: Not on file   Occupational History    Not on file   Tobacco Use    Smoking status: Never    Smokeless tobacco: Never   Vaping Use    Vaping status: Never Used   Substance and Sexual Activity    Alcohol use: Yes     Comment: occasionally    Drug use: Yes    Sexual activity: Not Currently     Partners: Male   Other Topics Concern    Not on file   Social History Narrative    Not on file     Social Drivers of Health      Financial Resource Strain: Not on file   Food Insecurity: No Food Insecurity (1/26/2025)    Nursing - Inadequate Food Risk Classification     Worried About Running Out of Food in the Last Year: Not on file     Ran Out of Food in the Last Year: Not on file     Ran Out of Food in the Last Year: Never true   Transportation Needs: No Transportation Needs (1/26/2025)    Nursing - Transportation Risk Classification     Lack of Transportation: Not on file     Lack of Transportation: No   Physical Activity: Not on file   Stress: Not on file   Social Connections: Not on file   Intimate Partner Violence: Unknown (1/26/2025)    Nursing IPS     Feels Physically and Emotionally Safe: Not on file     Physically Hurt by Someone: Not on file     Humiliated or Emotionally Abused by Someone: Not on file     Physically Hurt by Someone: No     Hurt or Threatened by Someone: No   Housing Stability: Unknown (1/26/2025)    Nursing: Inadequate Housing Risk Classification     Has Housing: Not on file     Worried About Losing Housing: Not on file     Unable to Get Utilities: Not on file     Unable to Pay for Housing in the Last Year: No     Has Housing: No      Family History[2]  Past Surgical History[3]  Current Medications[4]  Allergies   Allergen Reactions    Iv Dye [Iodinated Contrast Media] Hives    Levofloxacin Other (See Comments) and Itching     itching    Zocor [Simvastatin] Swelling     Swelling in hands when she took generic zocor        Labs:     Chemistry        Component Value Date/Time    K 3.7 01/27/2025 0541     01/27/2025 0541    CO2 26 01/27/2025 0541    BUN 14 01/27/2025 0541    CREATININE 0.63 01/27/2025 0541        Component Value Date/Time    CALCIUM 9.0 01/27/2025 0541    ALKPHOS 81 01/26/2025 1038    AST 12 (L) 01/26/2025 1038    ALT 14 01/26/2025 1038        Review of Systems   Constitutional: Negative for malaise/fatigue.   HENT:  Positive for congestion.    Cardiovascular:  Negative for chest pain,  dyspnea on exertion, leg swelling, orthopnea, palpitations, paroxysmal nocturnal dyspnea and syncope.   Respiratory:  Positive for cough. Negative for shortness of breath.    Gastrointestinal:  Positive for hemorrhoids. Negative for hematemesis and melena.   Genitourinary:  Negative for hematuria.   Neurological:  Negative for dizziness, focal weakness, headaches, light-headedness, loss of balance and weakness.       Vitals:    07/15/25 1506   BP: 128/78   Pulse: 81   SpO2: 94%     Vitals:    07/15/25 1506   Weight: 101 kg (222 lb)     Height: 5' (152.4 cm)   Body mass index is 43.36 kg/m².    Physical Exam:  Physical Exam  Constitutional:       Appearance: Normal appearance. She is obese.   HENT:      Head: Normocephalic and atraumatic.      Nose: Nose normal.      Mouth/Throat:      Mouth: Mucous membranes are moist.     Cardiovascular:      Rate and Rhythm: Normal rate and regular rhythm.      Pulses: Normal pulses.      Heart sounds: Normal heart sounds.   Pulmonary:      Effort: Pulmonary effort is normal.      Breath sounds: Normal breath sounds.   Abdominal:      General: There is no distension.      Palpations: Abdomen is soft.      Tenderness: There is no abdominal tenderness.     Musculoskeletal:         General: Normal range of motion.      Right lower leg: No edema.      Left lower leg: No edema.     Skin:     General: Skin is warm.      Capillary Refill: Capillary refill takes less than 2 seconds.     Neurological:      General: No focal deficit present.      Mental Status: She is alert and oriented to person, place, and time. Mental status is at baseline.     Psychiatric:         Mood and Affect: Mood normal.         Behavior: Behavior normal.              [1]   Past Medical History:  Diagnosis Date    Asthma     Atrial fibrillation (HCC)     CPAP (continuous positive airway pressure) dependence     GERD (gastroesophageal reflux disease)     History of diverticulitis     Hyperlipemia     Hypertension      Impaired fasting glucose     Sleep apnea    [2]   Family History  Problem Relation Name Age of Onset    Heart disease Father      Colon cancer Paternal Grandmother     [3]   Past Surgical History:  Procedure Laterality Date    APPENDECTOMY      CARDIOVERSION       SECTION      COLON SURGERY      for obstrution    HYSTERECTOMY      TONSILLECTOMY      TUBAL LIGATION     [4]   Current Outpatient Medications:     amLODIPine (NORVASC) 5 mg tablet, 2.5 in am and 2.5 in pm, Disp: , Rfl:     atorvastatin (LIPITOR) 10 mg tablet, , Disp: , Rfl:     flecainide (TAMBOCOR) 100 mg tablet, Take 1 tablet (100 mg total) by mouth every 12 (twelve) hours, Disp: 60 tablet, Rfl: 0    Fluticasone-Salmeterol (Advair Diskus) 250-50 mcg/dose inhaler, , Disp: , Rfl:     metoprolol tartrate (LOPRESSOR) 100 mg tablet, Take 1 tablet (100 mg total) by mouth every 12 (twelve) hours, Disp: 60 tablet, Rfl: 0    omeprazole (PriLOSEC) 20 mg delayed release capsule, , Disp: , Rfl:     Xarelto 20 MG tablet, , Disp: , Rfl:     azelastine (ASTELIN) 0.1 % nasal spray, 1 spray into each nostril 2 (two) times a day Use in each nostril as directed (Patient not taking: Reported on 7/15/2025), Disp: 1 mL, Rfl: 3

## 2025-07-21 ENCOUNTER — TELEPHONE (OUTPATIENT)
Age: 75
End: 2025-07-21

## 2025-07-21 NOTE — TELEPHONE ENCOUNTER
Compliance looks good. I would advise them to make sure humidification is turned up to highest setting. They can try also using a humidifier in the room where they sleep. Recommend adequate hydration during the day, not too close to bedtime to avoid nighttime wake-ups. Biotene mouth drops can help too.

## 2025-07-21 NOTE — TELEPHONE ENCOUNTER
Patient is calling because they are being woken up by the fact that their mouth is very dry. Please call patient back inregards to this thank you

## 2025-07-22 NOTE — TELEPHONE ENCOUNTER
Called Kat and NESHA on AM relaying msg from Provider suggesting Humidification Settings, Humidifier, and Biotene. If she has further questions, to give the office a call. If not, we will see her at her next appt in mid August. Left office contact info if needed.

## 2025-08-15 ENCOUNTER — OFFICE VISIT (OUTPATIENT)
Age: 75
End: 2025-08-15
Payer: MEDICARE

## 2025-08-22 DIAGNOSIS — M54.16 RADICULOPATHY, LUMBAR REGION: ICD-10-CM
